# Patient Record
Sex: MALE | Race: WHITE | NOT HISPANIC OR LATINO | Employment: OTHER | URBAN - METROPOLITAN AREA
[De-identification: names, ages, dates, MRNs, and addresses within clinical notes are randomized per-mention and may not be internally consistent; named-entity substitution may affect disease eponyms.]

---

## 2018-08-31 ENCOUNTER — OFFICE VISIT (OUTPATIENT)
Dept: URGENT CARE | Facility: CLINIC | Age: 63
End: 2018-08-31
Payer: MEDICAID

## 2018-08-31 VITALS
DIASTOLIC BLOOD PRESSURE: 84 MMHG | BODY MASS INDEX: 26.88 KG/M2 | SYSTOLIC BLOOD PRESSURE: 132 MMHG | TEMPERATURE: 98 F | RESPIRATION RATE: 18 BRPM | OXYGEN SATURATION: 98 % | HEART RATE: 88 BPM | WEIGHT: 192 LBS | HEIGHT: 71 IN

## 2018-08-31 DIAGNOSIS — M25.521 PAIN AND SWELLING OF RIGHT ELBOW: ICD-10-CM
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
## 2018-08-31 DIAGNOSIS — S50.01XA CONTUSION OF RIGHT ELBOW, INITIAL ENCOUNTER: Primary | ICD-10-CM

## 2018-08-31 DIAGNOSIS — S50.311A ABRASION OF RIGHT ELBOW, INITIAL ENCOUNTER: ICD-10-CM

## 2018-08-31 DIAGNOSIS — M25.421 PAIN AND SWELLING OF RIGHT ELBOW: ICD-10-CM

## 2018-08-31 PROCEDURE — 99203 OFFICE O/P NEW LOW 30 MIN: CPT | Mod: 25,S$GLB,, | Performed by: PHYSICIAN ASSISTANT

## 2018-08-31 PROCEDURE — 90714 TD VACC NO PRESV 7 YRS+ IM: CPT | Mod: S$GLB,,, | Performed by: PHYSICIAN ASSISTANT

## 2018-08-31 PROCEDURE — 90471 IMMUNIZATION ADMIN: CPT | Mod: S$GLB,,, | Performed by: PHYSICIAN ASSISTANT

## 2018-08-31 RX ORDER — IBUPROFEN 600 MG/1
600 TABLET ORAL EVERY 6 HOURS PRN
Qty: 24 TABLET | Refills: 0 | Status: SHIPPED | OUTPATIENT
Start: 2018-08-31 | End: 2018-08-31 | Stop reason: CLARIF

## 2018-08-31 RX ORDER — FUROSEMIDE 20 MG/1
TABLET ORAL
Refills: 2 | COMMUNITY
Start: 2018-08-13

## 2018-08-31 RX ORDER — MUPIROCIN 20 MG/G
OINTMENT TOPICAL 2 TIMES DAILY
Qty: 22 G | Refills: 0 | Status: SHIPPED | OUTPATIENT
Start: 2018-08-31

## 2018-08-31 RX ORDER — WARFARIN 7.5 MG/1
TABLET ORAL
Refills: 1 | COMMUNITY
Start: 2018-08-20

## 2018-08-31 RX ORDER — DILTIAZEM HYDROCHLORIDE 240 MG/1
CAPSULE, EXTENDED RELEASE ORAL
Refills: 11 | COMMUNITY
Start: 2018-07-12

## 2018-08-31 RX ORDER — ACETAMINOPHEN 500 MG
500 TABLET ORAL
COMMUNITY

## 2018-08-31 RX ORDER — LISINOPRIL 10 MG/1
TABLET ORAL
Refills: 10 | COMMUNITY
Start: 2018-08-13

## 2018-08-31 RX ORDER — GABAPENTIN 300 MG/1
CAPSULE ORAL
Refills: 8 | COMMUNITY
Start: 2018-08-20

## 2018-08-31 NOTE — PROGRESS NOTES
"Subjective:       Patient ID: Ed Bunch is a 63 y.o. male.    Vitals:  height is 5' 11" (1.803 m) and weight is 87.1 kg (192 lb). His tympanic temperature is 97.5 °F (36.4 °C). His blood pressure is 132/84 and his pulse is 88. His respiration is 18 and oxygen saturation is 98%.     Chief Complaint: Elbow Injury (Pt fell and landed on R elbow.)    Pt states he fell and landed on his tailbone and R elbow, has some minor lacerations and swelling on elbow. HE C/O RIGHT ELBOW PAIN AND SWELLING. DENIES BACK PAIN. TD STATUS UNKNOWN. MB      Elbow Injury   This is a new problem. The current episode started today. The problem occurs constantly. The problem has been unchanged. Associated symptoms include joint swelling. Pertinent negatives include no abdominal pain, chest pain, chills, fever, headaches, nausea, rash, sore throat or vomiting. The symptoms are aggravated by bending. He has tried nothing for the symptoms. The treatment provided no relief.     Review of Systems   Constitution: Negative for chills and fever.   HENT: Negative for sore throat.    Eyes: Negative for blurred vision.   Cardiovascular: Negative for chest pain.   Respiratory: Negative for shortness of breath.    Skin: Negative for rash.   Musculoskeletal: Positive for falls, joint pain, joint swelling and stiffness. Negative for back pain.   Gastrointestinal: Negative for abdominal pain, diarrhea, nausea and vomiting.   Neurological: Negative for headaches.   Psychiatric/Behavioral: The patient is not nervous/anxious.        Objective:      Physical Exam   Constitutional: He appears well-developed and well-nourished. He is active. No distress.   HENT:   Head: Normocephalic and atraumatic.   Right Ear: Hearing and external ear normal.   Left Ear: Hearing and external ear normal.   Nose: Nose normal. No nasal deformity. No epistaxis.   Mouth/Throat: Oropharynx is clear and moist and mucous membranes are normal.   Eyes: Conjunctivae and lids are normal. " No scleral icterus.   Neck: Trachea normal and normal range of motion.   Cardiovascular: Intact distal pulses and normal pulses.   Pulmonary/Chest: Effort normal. No stridor. No respiratory distress.   Musculoskeletal:        Right elbow: He exhibits swelling (OLECRANON BURSA). He exhibits normal range of motion. Tenderness found. Olecranon process tenderness noted.   Neurological: He is alert. He has normal strength. He is not disoriented. No sensory deficit. GCS eye subscore is 4. GCS verbal subscore is 5. GCS motor subscore is 6.   Skin: Skin is warm and dry. Capillary refill takes less than 2 seconds. Abrasion (RIGHT ELBOW) noted. He is not diaphoretic.   Psychiatric: He has a normal mood and affect. His speech is normal and behavior is normal. He is attentive.   Nursing note and vitals reviewed.        X-ray Elbow Complete 3 Views Right    Result Date: 8/31/2018  EXAMINATION: XR ELBOW COMPLETE 3 VIEW RIGHT CLINICAL HISTORY: fall onto right elbow;. Pain in right elbow TECHNIQUE: AP, lateral, and oblique views of the right elbow were performed. COMPARISON: None FINDINGS: Bones are well mineralized. Alignment is within normal limits. No displaced fracture, dislocation or destructive osseous process.  No large elbow joint effusion.  Joint spaces appear relatively maintained.  Enthesophytes at the lateral humeral epicondyle. Nonspecific soft tissue swelling about the posterior aspect of the elbow likely representing edema or cellulitis.  No subcutaneous emphysema or radiodense retained foreign body.  Scattered advanced atherosclerotic vascular calcifications noted.     Posterior right elbow nonspecific soft tissue swelling, without acute displaced fracture-dislocation identified. Electronically signed by: Judah Walter MD Date:    08/31/2018 Time:    13:55  Assessment:       1. Contusion of right elbow, initial encounter    2. Abrasion of right elbow, initial encounter    3. Pain and swelling of right elbow    4.  Fall, initial encounter        Plan:         Contusion of right elbow, initial encounter    Abrasion of right elbow, initial encounter  -     Td Vaccine (Adult)  -     mupirocin (BACTROBAN) 2 % ointment; Apply topically 2 (two) times daily. AAA  Dispense: 22 g; Refill: 0    Pain and swelling of right elbow  -     X-Ray Elbow Complete 3 views Right; Future; Expected date: 08/31/2018  -     Discontinue: ibuprofen (ADVIL,MOTRIN) 600 MG tablet; Take 1 tablet (600 mg total) by mouth every 6 (six) hours as needed for Pain. Take with meals.  Dispense: 24 tablet; Refill: 0    Fall, initial encounter      Patient Instructions     Abrasions  Abrasions are skin scrapes. Their treatment depends on how large and deep the abrasion is.  Home care  You may be prescribed an antibiotic cream or ointment to apply to the wound. This helps prevent infection. Follow instructions when using this medicine.  General care  · To care for the abrasion, do the following each day for as long as directed by your healthcare provider.  ¨ If you were given a bandage, change it once a day. If your bandage sticks to the wound, soak it in warm water until it loosens.  ¨ Wash the area with soap and warm water. You may do this in a sink or under a tub faucet or shower. Rinse off the soap. Then pat the area dry with a clean towel.  ¨ If antibiotic ointment or cream was prescribed, reapply it to the wound as directed. Cover the wound with a fresh nonstick bandage. If the bandage becomes wet or dirty, change it as soon as possible.  ¨ Some antibiotic ointments or cream can cause an allergic reaction or dermatitis. This may cause redness, itching and or hives. If this occurs, stop using the ointment immediately and wash off any remaining ointment. You may need to take some allergy medicine to relieve symptoms.  · You may use acetaminophen or ibuprofen to control pain unless another pain medicine was prescribed. Talk with your healthcare provider before using  these medicines if you have chronic liver or kidney disease or ever had a stomach ulcer or GI bleeding. Dont use ibuprofen in children younger than six months old.  · Most skin wounds heal within 10 days. But an infection may occur even with treatment. So its important to watch the wound for signs of infection as listed below.  Follow-up care  Follow up with your healthcare provider, or as advised.  When to seek medical advice  Call your healthcare provider right away if any of these occur:  · Fever of 100.4ºF (38ºC) or higher, or as directed by your healthcare provider  · Increasing pain, redness, swelling, or drainage from the wound  · Bleeding from the wound that does not stop after a few minutes of steady, firm pressure  · Decreased ability to move any body part near the wound  Date Last Reviewed: 3/3/2017  © 3119-5026 [employer]. [address] [place] [address]. All rights reserved. This information is not intended as a substitute for professional medical care. Always follow your healthcare professional's instructions.        Elbow Bruise  You have a bruise (contusion) of your elbow. A bruise causes local pain, swelling, and sometimes bruising. There are no broken bones. This injury takes a few days to a few weeks to heal. You may be given a sling for comfort and arm support.  You may notice color changes over the skin. It may change from reddish to bluish to greenish or yellowish before the bruising fades. The skin will then go back to its normal color.  Home care  Follow these guidelines when caring for yourself at home.  · Keep your arm elevated to reduce pain and swelling. This is most important during the first 2 days (48 hours) after the injury.  · Put an ice pack on the injured area. Do this for 20 minutes every 1 to 2 hours the first day. You can make an ice pack by wrapping a plastic bag of ice in a thin towel. You should continue to use the ice pack 3 to 4 times a day for  the next 2 days. Then use the ice pack as needed to ease pain and swelling.  · Dont use a heating pad.  · Dont stick a needle into the contusion or bruising to drain it.  · You may use acetaminophen or ibuprofen to control pain, unless another pain medicine was prescribed. If you have chronic liver or kidney disease, talk with your healthcare provider before using these medicines. Also talk with your provider if youve had a stomach ulcer or gastrointestinal bleeding.  · If a sling was provided, you may take it off to shower or bathe. Dont wear it for more than 1 week or it may cause joint stiffness.  Follow-up care  Follow up with your healthcare provider, or as advised, if you are not starting to get better within the next 3 days.  When to seek medical advice  Call your healthcare provider right away if any of these occur:  · Pain or swelling gets worse  · The back of your elbow becomes very swollen where it almost looks like a gold ball or egg-like mass is growing there. This is a sign of olecrenon bursitis or septic bursitis which may need immediate treatment.  · Redness, red streaks down the arm, warmth, or drainage from the bruise  · Hand or fingers becomes cold, blue, numb, or tingly  · New bruises, and you dont know what caused them  · Contusion doesnt heal  Date Last Reviewed: 2/1/2017  © 1424-9592 [employer]. [address] [place] [address], [place] [address]. All rights reserved. This information is not intended as a substitute for professional medical care. Always follow your healthcare professional's instructions.

## 2018-08-31 NOTE — PATIENT INSTRUCTIONS
Abrasions  Abrasions are skin scrapes. Their treatment depends on how large and deep the abrasion is.  Home care  You may be prescribed an antibiotic cream or ointment to apply to the wound. This helps prevent infection. Follow instructions when using this medicine.  General care  · To care for the abrasion, do the following each day for as long as directed by your healthcare provider.  ¨ If you were given a bandage, change it once a day. If your bandage sticks to the wound, soak it in warm water until it loosens.  ¨ Wash the area with soap and warm water. You may do this in a sink or under a tub faucet or shower. Rinse off the soap. Then pat the area dry with a clean towel.  ¨ If antibiotic ointment or cream was prescribed, reapply it to the wound as directed. Cover the wound with a fresh nonstick bandage. If the bandage becomes wet or dirty, change it as soon as possible.  ¨ Some antibiotic ointments or cream can cause an allergic reaction or dermatitis. This may cause redness, itching and or hives. If this occurs, stop using the ointment immediately and wash off any remaining ointment. You may need to take some allergy medicine to relieve symptoms.  · You may use acetaminophen or ibuprofen to control pain unless another pain medicine was prescribed. Talk with your healthcare provider before using these medicines if you have chronic liver or kidney disease or ever had a stomach ulcer or GI bleeding. Dont use ibuprofen in children younger than six months old.  · Most skin wounds heal within 10 days. But an infection may occur even with treatment. So its important to watch the wound for signs of infection as listed below.  Follow-up care  Follow up with your healthcare provider, or as advised.  When to seek medical advice  Call your healthcare provider right away if any of these occur:  · Fever of 100.4ºF (38ºC) or higher, or as directed by your healthcare provider  · Increasing pain, redness, swelling, or  drainage from the wound  · Bleeding from the wound that does not stop after a few minutes of steady, firm pressure  · Decreased ability to move any body part near the wound  Date Last Reviewed: 3/3/2017  © 8669-0095 [employer]. [address] [place] [address], [place] [address]. All rights reserved. This information is not intended as a substitute for professional medical care. Always follow your healthcare professional's instructions.        Elbow Bruise  You have a bruise (contusion) of your elbow. A bruise causes local pain, swelling, and sometimes bruising. There are no broken bones. This injury takes a few days to a few weeks to heal. You may be given a sling for comfort and arm support.  You may notice color changes over the skin. It may change from reddish to bluish to greenish or yellowish before the bruising fades. The skin will then go back to its normal color.  Home care  Follow these guidelines when caring for yourself at home.  · Keep your arm elevated to reduce pain and swelling. This is most important during the first 2 days (48 hours) after the injury.  · Put an ice pack on the injured area. Do this for 20 minutes every 1 to 2 hours the first day. You can make an ice pack by wrapping a plastic bag of ice in a thin towel. You should continue to use the ice pack 3 to 4 times a day for the next 2 days. Then use the ice pack as needed to ease pain and swelling.  · Dont use a heating pad.  · Dont stick a needle into the contusion or bruising to drain it.  · You may use acetaminophen or ibuprofen to control pain, unless another pain medicine was prescribed. If you have chronic liver or kidney disease, talk with your healthcare provider before using these medicines. Also talk with your provider if youve had a stomach ulcer or gastrointestinal bleeding.  · If a sling was provided, you may take it off to shower or bathe. Dont wear it for more than 1 week or it may cause joint stiffness.  Follow-up  care  Follow up with your healthcare provider, or as advised, if you are not starting to get better within the next 3 days.  When to seek medical advice  Call your healthcare provider right away if any of these occur:  · Pain or swelling gets worse  · The back of your elbow becomes very swollen where it almost looks like a gold ball or egg-like mass is growing there. This is a sign of olecrenon bursitis or septic bursitis which may need immediate treatment.  · Redness, red streaks down the arm, warmth, or drainage from the bruise  · Hand or fingers becomes cold, blue, numb, or tingly  · New bruises, and you dont know what caused them  · Contusion doesnt heal  Date Last Reviewed: 2/1/2017  © 5487-0890 [employer]. [address] [place] [address], [place] [address]. All rights reserved. This information is not intended as a substitute for professional medical care. Always follow your healthcare professional's instructions.

## 2019-02-15 ENCOUNTER — HOSPITAL ENCOUNTER (INPATIENT)
Facility: HOSPITAL | Age: 64
LOS: 4 days | Discharge: HOME HEALTH | DRG: 871 | End: 2019-02-19
Attending: EMERGENCY MEDICINE | Admitting: INTERNAL MEDICINE
Payer: MEDICAID

## 2019-02-15 DIAGNOSIS — R10.9 ACUTE RIGHT FLANK PAIN: ICD-10-CM

## 2019-02-15 DIAGNOSIS — I10 ESSENTIAL HYPERTENSION: ICD-10-CM

## 2019-02-15 DIAGNOSIS — R07.9 CHEST PAIN: ICD-10-CM

## 2019-02-15 DIAGNOSIS — R19.7 DIARRHEA, UNSPECIFIED TYPE: ICD-10-CM

## 2019-02-15 DIAGNOSIS — B95.5 STREPTOCOCCAL BACTEREMIA: ICD-10-CM

## 2019-02-15 DIAGNOSIS — Z79.01 CURRENT USE OF LONG TERM ANTICOAGULATION: ICD-10-CM

## 2019-02-15 DIAGNOSIS — A41.9 SEPSIS DUE TO PNEUMONIA: ICD-10-CM

## 2019-02-15 DIAGNOSIS — J18.9 PNEUMONIA OF RIGHT MIDDLE LOBE DUE TO INFECTIOUS ORGANISM: Primary | ICD-10-CM

## 2019-02-15 DIAGNOSIS — E87.1 HYPONATREMIA: ICD-10-CM

## 2019-02-15 DIAGNOSIS — I50.23 ACUTE ON CHRONIC SYSTOLIC HEART FAILURE: ICD-10-CM

## 2019-02-15 DIAGNOSIS — J18.9 SEPSIS DUE TO PNEUMONIA: ICD-10-CM

## 2019-02-15 DIAGNOSIS — R78.81 STREPTOCOCCAL BACTEREMIA: ICD-10-CM

## 2019-02-15 DIAGNOSIS — I48.91 ATRIAL FIBRILLATION WITH RVR: ICD-10-CM

## 2019-02-15 DIAGNOSIS — I50.9 HEART FAILURE: ICD-10-CM

## 2019-02-15 LAB
ALBUMIN SERPL BCP-MCNC: 3.3 G/DL
ALP SERPL-CCNC: 132 U/L
ALT SERPL W/O P-5'-P-CCNC: 29 U/L
ANION GAP SERPL CALC-SCNC: 11 MMOL/L
APTT BLDCRRT: 45.8 SEC
AST SERPL-CCNC: 25 U/L
BACTERIA #/AREA URNS AUTO: NORMAL /HPF
BASOPHILS # BLD AUTO: 0.04 K/UL
BASOPHILS NFR BLD: 0.2 %
BILIRUB SERPL-MCNC: 0.7 MG/DL
BILIRUB UR QL STRIP: NEGATIVE
BNP SERPL-MCNC: 84 PG/ML
BUN SERPL-MCNC: 23 MG/DL
CALCIUM SERPL-MCNC: 10.5 MG/DL
CHLORIDE SERPL-SCNC: 99 MMOL/L
CLARITY UR REFRACT.AUTO: CLEAR
CO2 SERPL-SCNC: 18 MMOL/L
COLOR UR AUTO: YELLOW
CREAT SERPL-MCNC: 1 MG/DL
DIFFERENTIAL METHOD: ABNORMAL
EOSINOPHIL # BLD AUTO: 0 K/UL
EOSINOPHIL NFR BLD: 0 %
ERYTHROCYTE [DISTWIDTH] IN BLOOD BY AUTOMATED COUNT: 12.3 %
EST. GFR  (AFRICAN AMERICAN): >60 ML/MIN/1.73 M^2
EST. GFR  (NON AFRICAN AMERICAN): >60 ML/MIN/1.73 M^2
GLUCOSE SERPL-MCNC: 124 MG/DL
GLUCOSE UR QL STRIP: NEGATIVE
HCT VFR BLD AUTO: 35.3 %
HGB BLD-MCNC: 12.2 G/DL
HGB UR QL STRIP: NEGATIVE
HYALINE CASTS UR QL AUTO: 1 /LPF
IMM GRANULOCYTES # BLD AUTO: 0.26 K/UL
IMM GRANULOCYTES NFR BLD AUTO: 1.2 %
INR PPP: 4.2
KETONES UR QL STRIP: NEGATIVE
LACTATE SERPL-SCNC: 0.9 MMOL/L
LEUKOCYTE ESTERASE UR QL STRIP: NEGATIVE
LIPASE SERPL-CCNC: 27 U/L
LYMPHOCYTES # BLD AUTO: 1.5 K/UL
LYMPHOCYTES NFR BLD: 7.1 %
MCH RBC QN AUTO: 36.6 PG
MCHC RBC AUTO-ENTMCNC: 34.6 G/DL
MCV RBC AUTO: 106 FL
MICROSCOPIC COMMENT: NORMAL
MONOCYTES # BLD AUTO: 1.6 K/UL
MONOCYTES NFR BLD: 7.8 %
NEUTROPHILS # BLD AUTO: 17.5 K/UL
NEUTROPHILS NFR BLD: 83.7 %
NITRITE UR QL STRIP: NEGATIVE
NRBC BLD-RTO: 0 /100 WBC
PH UR STRIP: 5 [PH] (ref 5–8)
PLATELET # BLD AUTO: 281 K/UL
PMV BLD AUTO: 8.7 FL
POTASSIUM SERPL-SCNC: 4.3 MMOL/L
PROT SERPL-MCNC: 8.2 G/DL
PROT UR QL STRIP: NEGATIVE
PROTHROMBIN TIME: 41.3 SEC
RBC # BLD AUTO: 3.33 M/UL
RBC #/AREA URNS AUTO: 3 /HPF (ref 0–4)
SODIUM SERPL-SCNC: 128 MMOL/L
SP GR UR STRIP: 1.03 (ref 1–1.03)
TROPONIN I SERPL DL<=0.01 NG/ML-MCNC: <0.006 NG/ML
URN SPEC COLLECT METH UR: NORMAL
WBC # BLD AUTO: 20.94 K/UL
WBC #/AREA URNS AUTO: 2 /HPF (ref 0–5)

## 2019-02-15 PROCEDURE — 99223 1ST HOSP IP/OBS HIGH 75: CPT | Mod: ,,, | Performed by: PHYSICIAN ASSISTANT

## 2019-02-15 PROCEDURE — 96361 HYDRATE IV INFUSION ADD-ON: CPT

## 2019-02-15 PROCEDURE — 87040 BLOOD CULTURE FOR BACTERIA: CPT

## 2019-02-15 PROCEDURE — 12000002 HC ACUTE/MED SURGE SEMI-PRIVATE ROOM

## 2019-02-15 PROCEDURE — 96366 THER/PROPH/DIAG IV INF ADDON: CPT

## 2019-02-15 PROCEDURE — 25000003 PHARM REV CODE 250: Performed by: EMERGENCY MEDICINE

## 2019-02-15 PROCEDURE — 99285 PR EMERGENCY DEPT VISIT,LEVEL V: ICD-10-PCS | Mod: ,,, | Performed by: EMERGENCY MEDICINE

## 2019-02-15 PROCEDURE — 93010 ELECTROCARDIOGRAM REPORT: CPT | Mod: ,,, | Performed by: INTERNAL MEDICINE

## 2019-02-15 PROCEDURE — 96376 TX/PRO/DX INJ SAME DRUG ADON: CPT

## 2019-02-15 PROCEDURE — 85025 COMPLETE CBC W/AUTO DIFF WBC: CPT

## 2019-02-15 PROCEDURE — 99285 EMERGENCY DEPT VISIT HI MDM: CPT | Mod: 25

## 2019-02-15 PROCEDURE — 85730 THROMBOPLASTIN TIME PARTIAL: CPT

## 2019-02-15 PROCEDURE — 63600175 PHARM REV CODE 636 W HCPCS: Performed by: EMERGENCY MEDICINE

## 2019-02-15 PROCEDURE — 87077 CULTURE AEROBIC IDENTIFY: CPT

## 2019-02-15 PROCEDURE — 63600175 PHARM REV CODE 636 W HCPCS: Performed by: PHYSICIAN ASSISTANT

## 2019-02-15 PROCEDURE — 93010 EKG 12-LEAD: ICD-10-PCS | Mod: ,,, | Performed by: INTERNAL MEDICINE

## 2019-02-15 PROCEDURE — 83605 ASSAY OF LACTIC ACID: CPT

## 2019-02-15 PROCEDURE — 11000001 HC ACUTE MED/SURG PRIVATE ROOM

## 2019-02-15 PROCEDURE — 85610 PROTHROMBIN TIME: CPT

## 2019-02-15 PROCEDURE — 81001 URINALYSIS AUTO W/SCOPE: CPT

## 2019-02-15 PROCEDURE — 93005 ELECTROCARDIOGRAM TRACING: CPT

## 2019-02-15 PROCEDURE — 80053 COMPREHEN METABOLIC PANEL: CPT

## 2019-02-15 PROCEDURE — 25500020 PHARM REV CODE 255: Performed by: EMERGENCY MEDICINE

## 2019-02-15 PROCEDURE — 83690 ASSAY OF LIPASE: CPT

## 2019-02-15 PROCEDURE — 99223 PR INITIAL HOSPITAL CARE,LEVL III: ICD-10-PCS | Mod: ,,, | Performed by: PHYSICIAN ASSISTANT

## 2019-02-15 PROCEDURE — 96365 THER/PROPH/DIAG IV INF INIT: CPT

## 2019-02-15 PROCEDURE — 96375 TX/PRO/DX INJ NEW DRUG ADDON: CPT

## 2019-02-15 PROCEDURE — 83880 ASSAY OF NATRIURETIC PEPTIDE: CPT

## 2019-02-15 PROCEDURE — 87186 SC STD MICRODIL/AGAR DIL: CPT

## 2019-02-15 PROCEDURE — 84484 ASSAY OF TROPONIN QUANT: CPT

## 2019-02-15 PROCEDURE — 99285 EMERGENCY DEPT VISIT HI MDM: CPT | Mod: ,,, | Performed by: EMERGENCY MEDICINE

## 2019-02-15 RX ORDER — OSELTAMIVIR PHOSPHATE 75 MG/1
75 CAPSULE ORAL DAILY
Status: DISCONTINUED | OUTPATIENT
Start: 2019-02-16 | End: 2019-02-15

## 2019-02-15 RX ORDER — LISINOPRIL 10 MG/1
10 TABLET ORAL DAILY
Status: DISCONTINUED | OUTPATIENT
Start: 2019-02-16 | End: 2019-02-16

## 2019-02-15 RX ORDER — MORPHINE SULFATE 2 MG/ML
2 INJECTION, SOLUTION INTRAMUSCULAR; INTRAVENOUS EVERY 6 HOURS PRN
Status: DISCONTINUED | OUTPATIENT
Start: 2019-02-16 | End: 2019-02-16

## 2019-02-15 RX ORDER — SODIUM CHLORIDE 9 MG/ML
INJECTION, SOLUTION INTRAVENOUS CONTINUOUS
Status: DISCONTINUED | OUTPATIENT
Start: 2019-02-16 | End: 2019-02-15

## 2019-02-15 RX ORDER — IBUPROFEN 200 MG
16 TABLET ORAL
Status: DISCONTINUED | OUTPATIENT
Start: 2019-02-15 | End: 2019-02-19 | Stop reason: HOSPADM

## 2019-02-15 RX ORDER — DILTIAZEM HYDROCHLORIDE 240 MG/1
240 CAPSULE, COATED, EXTENDED RELEASE ORAL DAILY
Status: DISCONTINUED | OUTPATIENT
Start: 2019-02-16 | End: 2019-02-16

## 2019-02-15 RX ORDER — MORPHINE SULFATE 4 MG/ML
4 INJECTION, SOLUTION INTRAMUSCULAR; INTRAVENOUS EVERY 4 HOURS PRN
Status: DISCONTINUED | OUTPATIENT
Start: 2019-02-15 | End: 2019-02-15

## 2019-02-15 RX ORDER — GABAPENTIN 300 MG/1
300 CAPSULE ORAL DAILY
Status: DISCONTINUED | OUTPATIENT
Start: 2019-02-16 | End: 2019-02-19 | Stop reason: HOSPADM

## 2019-02-15 RX ORDER — AMOXICILLIN 250 MG
1 CAPSULE ORAL 2 TIMES DAILY PRN
Status: DISCONTINUED | OUTPATIENT
Start: 2019-02-15 | End: 2019-02-19 | Stop reason: HOSPADM

## 2019-02-15 RX ORDER — SODIUM CHLORIDE 0.9 % (FLUSH) 0.9 %
5 SYRINGE (ML) INJECTION
Status: DISCONTINUED | OUTPATIENT
Start: 2019-02-15 | End: 2019-02-19 | Stop reason: HOSPADM

## 2019-02-15 RX ORDER — RAMELTEON 8 MG/1
8 TABLET ORAL NIGHTLY PRN
Status: DISCONTINUED | OUTPATIENT
Start: 2019-02-15 | End: 2019-02-19 | Stop reason: HOSPADM

## 2019-02-15 RX ORDER — FUROSEMIDE 20 MG/1
20 TABLET ORAL DAILY
Status: DISCONTINUED | OUTPATIENT
Start: 2019-02-16 | End: 2019-02-19 | Stop reason: HOSPADM

## 2019-02-15 RX ORDER — IBUPROFEN 200 MG
24 TABLET ORAL
Status: DISCONTINUED | OUTPATIENT
Start: 2019-02-15 | End: 2019-02-19 | Stop reason: HOSPADM

## 2019-02-15 RX ORDER — VANCOMYCIN HCL IN 5 % DEXTROSE 1.25 G/25
15 PLASTIC BAG, INJECTION (ML) INTRAVENOUS
Status: DISCONTINUED | OUTPATIENT
Start: 2019-02-16 | End: 2019-02-17

## 2019-02-15 RX ORDER — VANCOMYCIN 1.75 GRAM/500 ML IN 0.9 % SODIUM CHLORIDE INTRAVENOUS
20
Status: COMPLETED | OUTPATIENT
Start: 2019-02-15 | End: 2019-02-16

## 2019-02-15 RX ORDER — CEFTRIAXONE 1 G/1
1 INJECTION, POWDER, FOR SOLUTION INTRAMUSCULAR; INTRAVENOUS
Status: DISCONTINUED | OUTPATIENT
Start: 2019-02-15 | End: 2019-02-15

## 2019-02-15 RX ORDER — ACETAMINOPHEN 500 MG
1000 TABLET ORAL
Status: COMPLETED | OUTPATIENT
Start: 2019-02-15 | End: 2019-02-15

## 2019-02-15 RX ORDER — OSELTAMIVIR PHOSPHATE 75 MG/1
75 CAPSULE ORAL 2 TIMES DAILY
Status: DISCONTINUED | OUTPATIENT
Start: 2019-02-16 | End: 2019-02-18

## 2019-02-15 RX ORDER — ACETAMINOPHEN 325 MG/1
650 TABLET ORAL EVERY 4 HOURS PRN
Status: DISCONTINUED | OUTPATIENT
Start: 2019-02-15 | End: 2019-02-19 | Stop reason: HOSPADM

## 2019-02-15 RX ORDER — ACETAMINOPHEN 325 MG/1
650 TABLET ORAL EVERY 8 HOURS PRN
Status: DISCONTINUED | OUTPATIENT
Start: 2019-02-15 | End: 2019-02-15

## 2019-02-15 RX ORDER — LIDOCAINE 50 MG/G
1 PATCH TOPICAL
Status: DISCONTINUED | OUTPATIENT
Start: 2019-02-16 | End: 2019-02-19 | Stop reason: HOSPADM

## 2019-02-15 RX ORDER — MOXIFLOXACIN HYDROCHLORIDE 400 MG/250ML
400 INJECTION, SOLUTION INTRAVENOUS
Status: DISCONTINUED | OUTPATIENT
Start: 2019-02-15 | End: 2019-02-15

## 2019-02-15 RX ORDER — IPRATROPIUM BROMIDE AND ALBUTEROL SULFATE 2.5; .5 MG/3ML; MG/3ML
3 SOLUTION RESPIRATORY (INHALATION) EVERY 4 HOURS PRN
Status: DISCONTINUED | OUTPATIENT
Start: 2019-02-15 | End: 2019-02-19 | Stop reason: HOSPADM

## 2019-02-15 RX ORDER — LEVOFLOXACIN 750 MG/1
750 TABLET ORAL DAILY
Status: DISCONTINUED | OUTPATIENT
Start: 2019-02-15 | End: 2019-02-16

## 2019-02-15 RX ORDER — HYDROMORPHONE HYDROCHLORIDE 1 MG/ML
1 INJECTION, SOLUTION INTRAMUSCULAR; INTRAVENOUS; SUBCUTANEOUS
Status: COMPLETED | OUTPATIENT
Start: 2019-02-15 | End: 2019-02-15

## 2019-02-15 RX ORDER — ONDANSETRON 4 MG/1
4 TABLET, ORALLY DISINTEGRATING ORAL EVERY 8 HOURS PRN
Status: DISCONTINUED | OUTPATIENT
Start: 2019-02-15 | End: 2019-02-19 | Stop reason: HOSPADM

## 2019-02-15 RX ORDER — KETOROLAC TROMETHAMINE 30 MG/ML
15 INJECTION, SOLUTION INTRAMUSCULAR; INTRAVENOUS EVERY 6 HOURS PRN
Status: DISCONTINUED | OUTPATIENT
Start: 2019-02-15 | End: 2019-02-16

## 2019-02-15 RX ORDER — ONDANSETRON 2 MG/ML
4 INJECTION INTRAMUSCULAR; INTRAVENOUS EVERY 8 HOURS PRN
Status: DISCONTINUED | OUTPATIENT
Start: 2019-02-15 | End: 2019-02-15

## 2019-02-15 RX ORDER — MORPHINE SULFATE 4 MG/ML
4 INJECTION, SOLUTION INTRAMUSCULAR; INTRAVENOUS
Status: COMPLETED | OUTPATIENT
Start: 2019-02-15 | End: 2019-02-15

## 2019-02-15 RX ORDER — HYDROMORPHONE HYDROCHLORIDE 1 MG/ML
1 INJECTION, SOLUTION INTRAMUSCULAR; INTRAVENOUS; SUBCUTANEOUS EVERY 4 HOURS PRN
Status: DISCONTINUED | OUTPATIENT
Start: 2019-02-15 | End: 2019-02-15

## 2019-02-15 RX ORDER — OSELTAMIVIR PHOSPHATE 75 MG/1
75 CAPSULE ORAL
Status: COMPLETED | OUTPATIENT
Start: 2019-02-15 | End: 2019-02-15

## 2019-02-15 RX ORDER — VANCOMYCIN HCL IN 5 % DEXTROSE 1.25 G/25
15 PLASTIC BAG, INJECTION (ML) INTRAVENOUS
Status: DISCONTINUED | OUTPATIENT
Start: 2019-02-16 | End: 2019-02-15

## 2019-02-15 RX ORDER — GLUCAGON 1 MG
1 KIT INJECTION
Status: DISCONTINUED | OUTPATIENT
Start: 2019-02-15 | End: 2019-02-19 | Stop reason: HOSPADM

## 2019-02-15 RX ORDER — PROMETHAZINE HYDROCHLORIDE 12.5 MG/1
12.5 TABLET ORAL EVERY 6 HOURS PRN
Status: DISCONTINUED | OUTPATIENT
Start: 2019-02-15 | End: 2019-02-19 | Stop reason: HOSPADM

## 2019-02-15 RX ORDER — LEVOFLOXACIN 5 MG/ML
750 INJECTION, SOLUTION INTRAVENOUS
Status: DISCONTINUED | OUTPATIENT
Start: 2019-02-16 | End: 2019-02-16

## 2019-02-15 RX ORDER — SODIUM CHLORIDE 0.9 % (FLUSH) 0.9 %
5 SYRINGE (ML) INJECTION
Status: DISCONTINUED | OUTPATIENT
Start: 2019-02-15 | End: 2019-02-15

## 2019-02-15 RX ADMIN — HYDROMORPHONE HYDROCHLORIDE 1 MG: 1 INJECTION, SOLUTION INTRAMUSCULAR; INTRAVENOUS; SUBCUTANEOUS at 10:02

## 2019-02-15 RX ADMIN — OSELTAMIVIR PHOSPHATE 75 MG: 75 CAPSULE ORAL at 10:02

## 2019-02-15 RX ADMIN — HYDROMORPHONE HYDROCHLORIDE 1 MG: 1 INJECTION, SOLUTION INTRAMUSCULAR; INTRAVENOUS; SUBCUTANEOUS at 06:02

## 2019-02-15 RX ADMIN — SODIUM CHLORIDE 1000 ML: 0.9 INJECTION, SOLUTION INTRAVENOUS at 10:02

## 2019-02-15 RX ADMIN — IOHEXOL 100 ML: 350 INJECTION, SOLUTION INTRAVENOUS at 07:02

## 2019-02-15 RX ADMIN — LEVOFLOXACIN 750 MG: 750 TABLET, FILM COATED ORAL at 10:02

## 2019-02-15 RX ADMIN — ACETAMINOPHEN 1000 MG: 500 TABLET ORAL at 08:02

## 2019-02-15 RX ADMIN — MORPHINE SULFATE 4 MG: 4 INJECTION INTRAVENOUS at 06:02

## 2019-02-15 RX ADMIN — VANCOMYCIN HYDROCHLORIDE 1750 MG: 100 INJECTION, POWDER, LYOPHILIZED, FOR SOLUTION INTRAVENOUS at 10:02

## 2019-02-15 NOTE — ED TRIAGE NOTES
Pt reported to ED with 10/10 stabbing pain in the right side of lower lungs radiating to the back. Pain is stabbing with every breath. Lung sounds clear and not diminished. Pt tachypnic at 30 bpm. 96% O2 on room air. The pain started at 4:00pm while sitting and watching tv. Wife at the bed side. No complaints of chest pain, nausea or vomiting.no swelling of extremities. Pt has frequent episodes of diarrhea that started four days ago. Pt reported a hematuria yesterday.  Pt has hx of A fib, CHF. No surgical hx.

## 2019-02-15 NOTE — PROVIDER PROGRESS NOTES - EMERGENCY DEPT.
Encounter Date: 2/15/2019    ED Physician Progress Notes         EKG - STEMI Decision  Initial Reading: No STEMI present.  Response: No Action Needed.

## 2019-02-15 NOTE — ED PROVIDER NOTES
"SCRIBE #1 NOTE: I, Bryn Molina, am scribing for, and in the presence of, Nina Miranda MD. the EKG reading. Other sections scribed: HPI, ROS, PE, MDM.       CC: Chest Pain (Pt with right sided chest "cramping" increases with deep breath.  Pt states he has had the flu with coughing and congestion.)      HPI: Ed Bunch is a 63 y.o. year old male with co-morbidities including afib, CHF, on coumadin, and HTN who presents to the ED complaining of right flank pain this afternoon. Pt reports prior similar episode 2 days ago, which resolved after approximately 4 hours. He states that symptoms were still present at this time, but relatively more mild. Pt states flank pain is worse with heavy breathing. Non-radiating,. sharp Wife at bedside states he was dx with the flu 1 week ago. Pt reports SOB, cough, coughing blood 2 days ago, and diarrhea for 5 days; but denies urine problems, previous episodes of kidney stones, or any other complaints at this time. Pt states he has attempted to manage pain with tylenol with no relief. Reports blood in the urine x 2 days, no hx of kidney stones        Past Medical History:   Diagnosis Date    Atrial fibrillation     Hypertension      Past Surgical History:   Procedure Laterality Date    CARDIOVERSION      COLONOSCOPY      TONSILLECTOMY       Family History   Problem Relation Age of Onset    No Known Problems Mother     Cancer Father     Cancer Sister      No current facility-administered medications on file prior to encounter.      Current Outpatient Medications on File Prior to Encounter   Medication Sig Dispense Refill    acetaminophen (TYLENOL) 500 MG tablet Take 500 mg by mouth.      diltiaZEM (TIAZAC) 240 MG Cs24   11    gabapentin (NEURONTIN) 300 MG capsule TK ONE C PO  D  8    lisinopril 10 MG tablet TK 1 T PO  QHS  10    multivitamin capsule Take 1 capsule by mouth.      furosemide (LASIX) 20 MG tablet TK 1 T PO  D  2    mupirocin (BACTROBAN) 2 % ointment " Apply topically 2 (two) times daily. AAA 22 g 0    warfarin (COUMADIN) 7.5 MG tablet   1     Patient has no known allergies.  Social History     Socioeconomic History    Marital status:      Spouse name: Not on file    Number of children: Not on file    Years of education: Not on file    Highest education level: Not on file   Social Needs    Financial resource strain: Not on file    Food insecurity - worry: Not on file    Food insecurity - inability: Not on file    Transportation needs - medical: Not on file    Transportation needs - non-medical: Not on file   Occupational History    Not on file   Tobacco Use    Smoking status: Never Smoker    Smokeless tobacco: Never Used   Substance and Sexual Activity    Alcohol use: Not on file    Drug use: Not on file    Sexual activity: Not on file   Other Topics Concern    Not on file   Social History Narrative    Not on file       ROS:     Constitutional : neg  HEENT neg  Resp: SOB, cough, coughing blood  Cardiac  neg  GI neg  : right flank pain worse with heavy breathing, diarrhea   Neuro neg  Heme/Immune: neg  Endo neg  Skin neg    PHYSICAL EXAM:  Vitals:    02/15/19 2000   BP: 137/80   Pulse: 98   Resp: 20   Temp: 98.1 °F (36.7 °C)         PHYSICAL EXAM:   General: extremely uncomfortable, in pain sitting up at the edge of the bed  VS: triage VS reviewed  HEENT: NC/AT, PERRL, EOMI  Neck: trachea midline  CV: RRR, no m/r/g, no LE pitting edema  Resp: tachypneic, + BS but difficult to asess for abnormalities due to shallow breathing  ABD:  ND, + normal BS, localized pain to the right side of abdomen  Renal: No CVAT  Neuro: AAO x 3, 5/5 muscle strength in upper and lower extremities, sensation grossly intact, face symmetric, speech normal  Ext: no deformity, +2 symmetrical peripheral pulses          DATA & INTERVENTIONS:    LABS reviewed:  Labs Reviewed   CBC W/ AUTO DIFFERENTIAL - Abnormal; Notable for the following components:       Result Value     WBC 20.94 (*)     RBC 3.33 (*)     Hemoglobin 12.2 (*)     Hematocrit 35.3 (*)      (*)     MCH 36.6 (*)     MPV 8.7 (*)     Immature Granulocytes 1.2 (*)     Gran # (ANC) 17.5 (*)     Immature Grans (Abs) 0.26 (*)     Mono # 1.6 (*)     Gran% 83.7 (*)     Lymph% 7.1 (*)     All other components within normal limits   COMPREHENSIVE METABOLIC PANEL - Abnormal; Notable for the following components:    Sodium 128 (*)     CO2 18 (*)     Glucose 124 (*)     Albumin 3.3 (*)     All other components within normal limits   APTT - Abnormal; Notable for the following components:    aPTT 45.8 (*)     All other components within normal limits   PROTIME-INR - Abnormal; Notable for the following components:    Prothrombin Time 41.3 (*)     INR 4.2 (*)     All other components within normal limits   CULTURE, BLOOD   CULTURE, BLOOD   TROPONIN I   URINALYSIS, REFLEX TO URINE CULTURE    Narrative:     Preferred Collection Type->Urine, Clean Catch  yellow and grey   LIPASE   LACTIC ACID, PLASMA   URINALYSIS MICROSCOPIC    Narrative:     Preferred Collection Type->Urine, Clean Catch  yellow and grey       RADIOLOGY reviewed:  Imaging Results          CT Abdomen Pelvis With Contrast (Final result)  Result time 02/15/19 21:12:54    Final result by Yoni Veliz MD (02/15/19 21:12:54)                 Impression:      No acute intra-abdominal findings.    Sigmoid diverticulosis without evidence of acute diverticulitis.    Bilateral L5 pars defects with grade 2 spondylolisthesis.    Right adrenal probable small myelolipoma.  Left adrenal probable small adenoma.    Trace dependent right pleural fluid with adjacent atelectatic change.  Consolidation in the medial segment right middle lobe.  Bandlike opacities are seen at the right lung base, likely atelectasis or scarring.    Electronically signed by resident: Rusty Brothers  Date:    02/15/2019  Time:    20:22    Electronically signed by: Yoni Veliz  MD  Date:    02/15/2019  Time:    21:12             Narrative:    EXAMINATION:  CT ABDOMEN PELVIS WITH CONTRAST    CLINICAL HISTORY:  severe right flank pain, hx of a. fib on coumadin, r/o bleed vs ischemia;    TECHNIQUE:  Low dose axial images, sagittal and coronal reformations were obtained from the lung bases to the pubic symphysis following the IV administration of 100 mL of Omnipaque 350 and the oral administration of 30 mL of Omnipaque 350.    COMPARISON:  None.    FINDINGS:  Heart: Normal in size. No pericardial effusion.    Lung Bases: Trace dependent right pleural fluid with adjacent atelectatic change.  Consolidation in the medial segment right middle lobe.  Bandlike opacities are seen at the right lung base, likely atelectasis or scarring.    Liver: Normal in size and attenuation, with no focal hepatic lesions.    Gallbladder: No calcified gallstones.    Bile Ducts: No evidence of dilated ducts.    Pancreas: No mass or peripancreatic fat stranding.    Spleen: Unremarkable.    Adrenals: Hypoattenuating small nodule in the right adrenal gland consistent with probable myelolipoma.  Subcentimeter left adrenal nodule consistent with probable adenoma.    Kidneys/ Ureters: Normal in size and location. Normal concentration and excretion of contrast. No hydronephrosis or nephrolithiasis. No ureteral dilatation.    Bladder: No evidence of wall thickening.    Reproductive organs: Unremarkable.    GI Tract/Mesentery: Sigmoid diverticulosis without evidence of acute diverticulitis.  No evidence of bowel obstruction or inflammation. Appendix visualized and unremarkable.    Peritoneal Space: No ascites. No free air.    Retroperitoneum:  No significant adenopathy.    Abdominal wall:  Small fat containing left inguinal hernia.    Vasculature: Mild atherosclerosis without aneurysm.    Bones: Bilateral L5 pars defects with grade 2 spondylolisthesis.  No aggressive osseous lesions.                               X-Ray Abdomen  AP 1 View (KUB) (Final result)  Result time 02/15/19 18:47:53    Final result by Judah Walter MD (02/15/19 18:47:53)                 Impression:      Nonobstructive bowel gas pattern.      Electronically signed by: Judah Walter MD  Date:    02/15/2019  Time:    18:47             Narrative:    EXAMINATION:  XR ABDOMEN AP 1 VIEW    CLINICAL HISTORY:  Unspecified abdominal pain    TECHNIQUE:  AP View(s) of the abdomen was performed.    COMPARISON:  None    FINDINGS:  Monitoring leads overlie the chest and abdomen.  Resolution is limited by body habitus with underpenetration.    Nonobstructive bowel gas pattern.  No organomegaly or significant mass effect.  No large amount of free air or abnormal intra-abdominal calcification definitively seen.  Suspected small bilateral pleural effusions, right greater than left as well as right basilar subsegmental scarring versus atelectasis.  Cardiac silhouette is enlarged.  No acute osseous process seen.                               X-Ray Chest 1 View (Final result)  Result time 02/15/19 18:52:56   Procedure changed from X-Ray Chest PA And Lateral     Final result by Judah Walter MD (02/15/19 18:52:56)                 Impression:      Findings suggesting pulmonary edema/CHF pattern with right greater than left small pleural effusions.  Short-term follow-up chest radiography after therapy is recommended to resolution.      Electronically signed by: Judah Walter MD  Date:    02/15/2019  Time:    18:52             Narrative:    EXAMINATION:  XR CHEST 1 VIEW    CLINICAL HISTORY:  chest pain; Chest pain, unspecified    TECHNIQUE:  Single frontal view of the chest was performed.    COMPARISON:  Abdominal radiograph same day    FINDINGS:  Monitoring leads overlie the chest.  Patient is slightly rotated.    Cardiac silhouette appears enlarged with prominence of the central pulmonary vasculature and bilateral diffuse interstitial coarsening with a perihilar and basilar predominance  suggesting pulmonary edema/CHF pattern.  Suspected small right and trace left pleural effusions with right basilar subsegmental atelectasis/infiltrate.  Prominent osseous spurring of the right anterior 1st rib.  Calcific atherosclerosis of the thoracic aorta.  Upper mediastinal contours are otherwise within normal limits.  No large pneumothorax.  No acute osseous process seen.                                MEDICATIONS/FLUIDS:  Medications   sodium chloride 0.9% bolus 1,000 mL (not administered)   oseltamivir capsule 75 mg (not administered)   HYDROmorphone injection 1 mg (not administered)   vancomycin 1750 mg in 0.9% sodium chloride 500 mL IVPB (not administered)   moxifloxacin 400 mg/250 mL IVPB 400 mg (not administered)   morphine injection 4 mg (4 mg Intravenous Given 2/15/19 1814)   HYDROmorphone injection 1 mg (1 mg Intravenous Given 2/15/19 1858)   acetaminophen tablet 1,000 mg (1,000 mg Oral Given 2/15/19 2014)   omnipaque 350 iohexol 100 mL (100 mLs Intravenous Given 2/15/19 1956)         MDM: 64 y/o ebne with hx of afib on coumadin, also hx of heart failure, presents with right sided flank pain that started acutely at 3 pm. He reports a similar episode approximately 2 days ago that resolved in about 4 hours. Differential diagnosis includes but is not limited to: kidney stone vs retroperitoneal bleed vs PNA vs pleural effusion vs pneumothorax vs PE vs Ao dissection vs pyelo. Recurrent nature of the pain and the presentation of the pt more suggestive of a kidney stone or biliary colic compared to other diagnosis. Will obtain UA, trop, CBC, CMP, chest x ray, and abdominal x ray before deciding for further management. Morphine for pain control.       EKG: afib, , no ischemic changes  WBC 20, however, the history of sudden onset of pain, no fever, does not correlate w/ an infectious symptom  patient reports hematuria 2 days ago and today    7:10 PM  Looks better, still sitting up in bed but pain only when  he moves, localizes the pain to the right flank, not chest, no LE weakness/numbness  CMP w/ mild hypoNa 128  LFTs wnl, total bili wnl  Trop wnl  Lipase wnl  cxr/abd xr: pulm edema, right pulm eff >left pulm eff      .Patient was signed-out to Dr. Bess     At 7:50 pm the change of shift with plan for:patietn w/ right flank pain, severe, sudden onset. Also hematuria. WBC 20, INR 4.2  patient received morphine and dilaudid for pain control    Pending:  UA  CT a/p  Lactate         Sandra Miranda MD  02/15/19 2516

## 2019-02-16 PROBLEM — B95.5 STREPTOCOCCAL BACTEREMIA: Status: ACTIVE | Noted: 2019-02-16

## 2019-02-16 PROBLEM — A41.9 SEPSIS DUE TO PNEUMONIA: Status: ACTIVE | Noted: 2019-02-16

## 2019-02-16 PROBLEM — J18.9 SEPSIS DUE TO PNEUMONIA: Status: ACTIVE | Noted: 2019-02-16

## 2019-02-16 PROBLEM — R78.81 STREPTOCOCCAL BACTEREMIA: Status: ACTIVE | Noted: 2019-02-16

## 2019-02-16 LAB
ANION GAP SERPL CALC-SCNC: 7 MMOL/L
ASCENDING AORTA: 3.41 CM
AV INDEX (PROSTH): 0.7
AV MEAN GRADIENT: 4.1 MMHG
AV PEAK GRADIENT: 7.08 MMHG
AV VALVE AREA: 3.16 CM2
AV VELOCITY RATIO: 0.74
BASOPHILS # BLD AUTO: 0.02 K/UL
BASOPHILS NFR BLD: 0.1 %
BSA FOR ECHO PROCEDURE: 2.11 M2
BUN SERPL-MCNC: 18 MG/DL
CALCIUM SERPL-MCNC: 9.8 MG/DL
CHLORIDE SERPL-SCNC: 102 MMOL/L
CO2 SERPL-SCNC: 21 MMOL/L
CREAT SERPL-MCNC: 0.7 MG/DL
CV ECHO LV RWT: 0.35 CM
DIFFERENTIAL METHOD: ABNORMAL
DOP CALC AO PEAK VEL: 1.33 M/S
DOP CALC AO VTI: 24.28 CM
DOP CALC LVOT AREA: 4.48 CM2
DOP CALC LVOT DIAMETER: 2.39 CM
DOP CALC LVOT PEAK VEL: 0.98 M/S
DOP CALC LVOT STROKE VOLUME: 76.72 CM3
DOP CALCLVOT PEAK VEL VTI: 17.11 CM
E WAVE DECELERATION TIME: 149.69 MSEC
E/A RATIO: 2.2
E/E' RATIO: 6.7
ECHO LV POSTERIOR WALL: 1.02 CM (ref 0.6–1.1)
EOSINOPHIL # BLD AUTO: 0 K/UL
EOSINOPHIL NFR BLD: 0.1 %
ERYTHROCYTE [DISTWIDTH] IN BLOOD BY AUTOMATED COUNT: 12.4 %
EST. GFR  (AFRICAN AMERICAN): >60 ML/MIN/1.73 M^2
EST. GFR  (NON AFRICAN AMERICAN): >60 ML/MIN/1.73 M^2
FRACTIONAL SHORTENING: 25 % (ref 28–44)
GLUCOSE SERPL-MCNC: 89 MG/DL
HCT VFR BLD AUTO: 33.3 %
HGB BLD-MCNC: 10.9 G/DL
IMM GRANULOCYTES # BLD AUTO: 0.12 K/UL
IMM GRANULOCYTES NFR BLD AUTO: 0.8 %
INFLUENZA A, MOLECULAR: NEGATIVE
INFLUENZA B, MOLECULAR: NEGATIVE
INR PPP: 4.2
INTERVENTRICULAR SEPTUM: 0.89 CM (ref 0.6–1.1)
LA MAJOR: 7.24 CM
LA MINOR: 6.54 CM
LA WIDTH: 4.91 CM
LEFT ATRIUM SIZE: 5.65 CM
LEFT ATRIUM VOLUME INDEX: 77.5 ML/M2
LEFT ATRIUM VOLUME: 162.05 CM3
LEFT INTERNAL DIMENSION IN SYSTOLE: 4.41 CM (ref 2.1–4)
LEFT VENTRICLE DIASTOLIC VOLUME INDEX: 81.12 ML/M2
LEFT VENTRICLE DIASTOLIC VOLUME: 169.61 ML
LEFT VENTRICLE MASS INDEX: 106.6 G/M2
LEFT VENTRICLE SYSTOLIC VOLUME INDEX: 42.1 ML/M2
LEFT VENTRICLE SYSTOLIC VOLUME: 88.11 ML
LEFT VENTRICULAR INTERNAL DIMENSION IN DIASTOLE: 5.85 CM (ref 3.5–6)
LEFT VENTRICULAR MASS: 222.82 G
LV LATERAL E/E' RATIO: 5.5
LV SEPTAL E/E' RATIO: 8.56
LYMPHOCYTES # BLD AUTO: 1.4 K/UL
LYMPHOCYTES NFR BLD: 8.9 %
MAGNESIUM SERPL-MCNC: 2 MG/DL
MCH RBC QN AUTO: 34.6 PG
MCHC RBC AUTO-ENTMCNC: 32.7 G/DL
MCV RBC AUTO: 106 FL
MONOCYTES # BLD AUTO: 1.7 K/UL
MONOCYTES NFR BLD: 10.8 %
MV PEAK A VEL: 0.35 M/S
MV PEAK E VEL: 0.77 M/S
NEUTROPHILS # BLD AUTO: 12.7 K/UL
NEUTROPHILS NFR BLD: 79.3 %
NRBC BLD-RTO: 0 /100 WBC
PHOSPHATE SERPL-MCNC: 2.7 MG/DL
PISA TR MAX VEL: 2.66 M/S
PLATELET # BLD AUTO: 256 K/UL
PMV BLD AUTO: 8.8 FL
POTASSIUM SERPL-SCNC: 4.3 MMOL/L
PROTHROMBIN TIME: 41.1 SEC
PULM VEIN S/D RATIO: 0.43
PV PEAK D VEL: 0.6 M/S
PV PEAK S VEL: 0.26 M/S
RA MAJOR: 6.93 CM
RA PRESSURE: 8 MMHG
RA WIDTH: 5.62 CM
RBC # BLD AUTO: 3.15 M/UL
RIGHT VENTRICULAR END-DIASTOLIC DIMENSION: 3.99 CM
RV TISSUE DOPPLER FREE WALL SYSTOLIC VELOCITY 1 (APICAL 4 CHAMBER VIEW): 9.71 M/S
SINUS: 3.4 CM
SODIUM SERPL-SCNC: 130 MMOL/L
SPECIMEN SOURCE: NORMAL
STJ: 2.7 CM
TDI LATERAL: 0.14
TDI SEPTAL: 0.09
TDI: 0.12
TR MAX PG: 28.3 MMHG
TRICUSPID ANNULAR PLANE SYSTOLIC EXCURSION: 2.05 CM
TV REST PULMONARY ARTERY PRESSURE: 36 MMHG
VANCOMYCIN SERPL-MCNC: 15.2 UG/ML
WBC # BLD AUTO: 15.93 K/UL

## 2019-02-16 PROCEDURE — 85610 PROTHROMBIN TIME: CPT

## 2019-02-16 PROCEDURE — 63600175 PHARM REV CODE 636 W HCPCS: Performed by: HOSPITALIST

## 2019-02-16 PROCEDURE — 25000003 PHARM REV CODE 250: Performed by: PHYSICIAN ASSISTANT

## 2019-02-16 PROCEDURE — 36415 COLL VENOUS BLD VENIPUNCTURE: CPT

## 2019-02-16 PROCEDURE — 83735 ASSAY OF MAGNESIUM: CPT

## 2019-02-16 PROCEDURE — 84100 ASSAY OF PHOSPHORUS: CPT

## 2019-02-16 PROCEDURE — 99233 PR SUBSEQUENT HOSPITAL CARE,LEVL III: ICD-10-PCS | Mod: ,,, | Performed by: HOSPITALIST

## 2019-02-16 PROCEDURE — 87040 BLOOD CULTURE FOR BACTERIA: CPT | Mod: 59

## 2019-02-16 PROCEDURE — 80202 ASSAY OF VANCOMYCIN: CPT

## 2019-02-16 PROCEDURE — 63600175 PHARM REV CODE 636 W HCPCS: Performed by: PHYSICIAN ASSISTANT

## 2019-02-16 PROCEDURE — 99233 SBSQ HOSP IP/OBS HIGH 50: CPT | Mod: ,,, | Performed by: HOSPITALIST

## 2019-02-16 PROCEDURE — 25000003 PHARM REV CODE 250: Performed by: EMERGENCY MEDICINE

## 2019-02-16 PROCEDURE — 87502 INFLUENZA DNA AMP PROBE: CPT

## 2019-02-16 PROCEDURE — 80048 BASIC METABOLIC PNL TOTAL CA: CPT

## 2019-02-16 PROCEDURE — 11000001 HC ACUTE MED/SURG PRIVATE ROOM

## 2019-02-16 PROCEDURE — 85025 COMPLETE CBC W/AUTO DIFF WBC: CPT

## 2019-02-16 RX ORDER — HYDROMORPHONE HYDROCHLORIDE 1 MG/ML
1.5 INJECTION, SOLUTION INTRAMUSCULAR; INTRAVENOUS; SUBCUTANEOUS ONCE
Status: COMPLETED | OUTPATIENT
Start: 2019-02-16 | End: 2019-02-16

## 2019-02-16 RX ORDER — DILTIAZEM HYDROCHLORIDE 120 MG/1
120 CAPSULE, COATED, EXTENDED RELEASE ORAL NIGHTLY
Status: DISCONTINUED | OUTPATIENT
Start: 2019-02-16 | End: 2019-02-16

## 2019-02-16 RX ORDER — DILTIAZEM HYDROCHLORIDE 240 MG/1
240 CAPSULE, COATED, EXTENDED RELEASE ORAL NIGHTLY
Status: DISCONTINUED | OUTPATIENT
Start: 2019-02-16 | End: 2019-02-17

## 2019-02-16 RX ORDER — SODIUM CHLORIDE 9 MG/ML
INJECTION, SOLUTION INTRAVENOUS CONTINUOUS
Status: ACTIVE | OUTPATIENT
Start: 2019-02-16 | End: 2019-02-16

## 2019-02-16 RX ORDER — DILTIAZEM HYDROCHLORIDE 120 MG/1
120 CAPSULE, COATED, EXTENDED RELEASE ORAL NIGHTLY
Status: COMPLETED | OUTPATIENT
Start: 2019-02-16 | End: 2019-02-16

## 2019-02-16 RX ORDER — HYDROMORPHONE HYDROCHLORIDE 1 MG/ML
1 INJECTION, SOLUTION INTRAMUSCULAR; INTRAVENOUS; SUBCUTANEOUS EVERY 4 HOURS PRN
Status: DISCONTINUED | OUTPATIENT
Start: 2019-02-16 | End: 2019-02-18

## 2019-02-16 RX ADMIN — LIDOCAINE 1 PATCH: 50 PATCH TOPICAL at 12:02

## 2019-02-16 RX ADMIN — MORPHINE SULFATE 2 MG: 2 INJECTION, SOLUTION INTRAMUSCULAR; INTRAVENOUS at 06:02

## 2019-02-16 RX ADMIN — DILTIAZEM HYDROCHLORIDE 120 MG: 120 CAPSULE, COATED, EXTENDED RELEASE ORAL at 12:02

## 2019-02-16 RX ADMIN — ACETAMINOPHEN 650 MG: 325 TABLET ORAL at 10:02

## 2019-02-16 RX ADMIN — OSELTAMIVIR PHOSPHATE 75 MG: 75 CAPSULE ORAL at 08:02

## 2019-02-16 RX ADMIN — KETOROLAC TROMETHAMINE 15 MG: 30 INJECTION, SOLUTION INTRAMUSCULAR at 03:02

## 2019-02-16 RX ADMIN — GABAPENTIN 300 MG: 300 CAPSULE ORAL at 08:02

## 2019-02-16 RX ADMIN — THERA TABS 1 TABLET: TAB at 08:02

## 2019-02-16 RX ADMIN — MORPHINE SULFATE 2 MG: 2 INJECTION, SOLUTION INTRAMUSCULAR; INTRAVENOUS at 12:02

## 2019-02-16 RX ADMIN — LEVOFLOXACIN 750 MG: 750 TABLET, FILM COATED ORAL at 08:02

## 2019-02-16 RX ADMIN — FUROSEMIDE 20 MG: 20 TABLET ORAL at 08:02

## 2019-02-16 RX ADMIN — CEFTRIAXONE 2 G: 2 INJECTION, SOLUTION INTRAVENOUS at 09:02

## 2019-02-16 RX ADMIN — VANCOMYCIN HYDROCHLORIDE 1250 MG: 100 INJECTION, POWDER, LYOPHILIZED, FOR SOLUTION INTRAVENOUS at 10:02

## 2019-02-16 RX ADMIN — DILTIAZEM HYDROCHLORIDE 240 MG: 240 CAPSULE, COATED, EXTENDED RELEASE ORAL at 08:02

## 2019-02-16 RX ADMIN — HYDROMORPHONE HYDROCHLORIDE 1 MG: 1 INJECTION, SOLUTION INTRAMUSCULAR; INTRAVENOUS; SUBCUTANEOUS at 07:02

## 2019-02-16 RX ADMIN — KETOROLAC TROMETHAMINE 15 MG: 30 INJECTION, SOLUTION INTRAMUSCULAR at 10:02

## 2019-02-16 RX ADMIN — HYDROMORPHONE HYDROCHLORIDE 1.5 MG: 1 INJECTION, SOLUTION INTRAMUSCULAR; INTRAVENOUS; SUBCUTANEOUS at 03:02

## 2019-02-16 RX ADMIN — SODIUM CHLORIDE: 0.9 INJECTION, SOLUTION INTRAVENOUS at 01:02

## 2019-02-16 RX ADMIN — HYDROMORPHONE HYDROCHLORIDE 1 MG: 1 INJECTION, SOLUTION INTRAMUSCULAR; INTRAVENOUS; SUBCUTANEOUS at 11:02

## 2019-02-16 NOTE — ED NOTES
Attempted to call report to 6th floor. RN not assigned to bed and charge RN not available. Will attempt again.

## 2019-02-16 NOTE — ED PROVIDER NOTES
This patient was signed out to me at shift change pending results of labs and a CT of the abdomen and pelvis.  The patient presented with right-sided flank pain. He had an episode of flank pain a couple days ago that resolved.  He had a similar episode that began this afternoon but reports that is more severe.  I obtained history from the patient.  He reports pain to the right anterior lower chest wall that is worse with breathing.  In fact, he reports breathing difficulty due to this pain which prompted him to come to the ED.  The patient started having fever, chills, body aches approximately 1 week ago.  He presented to an urgent care clinic 5 days ago and was diagnosed with the flu.  Tamiflu was not started due to the duration of symptoms at that time.  He was instructed on supportive management.  He has been taking over-the-counter analgesics.  His symptoms progressed and he presented to the ED at [facility] yesterday.  He had a positive flu swab at that time.  He was discharged home on continued supportive home management.  In the ED today, the patient is afebrile.  He has a white count of 20,000. There are small bilateral pleural effusions on chest radiograph.  He developed tachycardia while in the ED.  CT of the abdomen and pelvis is negative for acute intra-abdominal pathology but does show a right middle lobe consolidation.  The patient is set to receive IV antibiotics and oral Tamiflu.  I discussed his presentation and workup with Dr. Avery and he is being admitted to the hospitalist service for further evaluation and management.     Dorian Bess III, MD  02/15/19 2213

## 2019-02-16 NOTE — ASSESSMENT & PLAN NOTE
Unclear etiology but likely AGE  - low suspicion for C. Diff without recent abx or hospitalization  - check stool studies, C. diff  - c/w abx for CAP

## 2019-02-16 NOTE — ASSESSMENT & PLAN NOTE
- likely 2/2 poor PO intake and GI losses  - Given 1L NS in ED.  C/w mIVF hydration- recheck with AM labs but will give slowly with heart failure

## 2019-02-16 NOTE — ASSESSMENT & PLAN NOTE
BP controlled  - hold lisinopril to prevent hypotension  - reduced dilt dose from 240 to 120 on admission, but will continue 240 mg nightly thereafter

## 2019-02-16 NOTE — PLAN OF CARE
Problem: Infection  Goal: Infection Symptom Resolution  Outcome: Ongoing (interventions implemented as appropriate)  Airborn isolation maintained and aseptic technique discussed with patient and spouse.Verbalizes understanding.

## 2019-02-16 NOTE — H&P
"[facility] Hospital Medicine  History & Physical    Patient Name: Ed Bunch  MRN: [id number]  Admission Date: 2/15/2019  Attending Physician: Shaquille Johnson MD   Primary Care Provider: Primary Doctor [facility] Medicine Team: [facility] MED  Rich Womack PA-C     Patient information was obtained from patient, past medical records and ER records.     Subjective:     Principal Problem:Pneumonia of right middle lobe due to infectious organism    Chief Complaint:   Chief Complaint   Patient presents with    Chest Pain     Pt with right sided chest "cramping" increases with deep breath.  Pt states he has had the flu with coughing and congestion.        HPI: Ed Bunch is a 63 y.o. year old male with co-morbidities including chronic afib (on coumadin), CHF, and HTN who presents to the ED complaining of  intermittent,worsening, non-radiating right flank pain this afternoon. Pain is worse with palpation, heavy breathing and described as sharp. Pt reports prior similar episode 2 days ago, which resolved after approximately 4 hours. He states that symptoms were still present at this time, but relatively more mild. Wife at bedside states he was dx with the flu 1 week ago. Pt reports f/c/night sweats, OTERO, orthopnea, productive yellow cough x7 days, and diarrhea for 5 days.  Patient reports daughter recently flu positive and found flu positive yesterday at [facility].  Pt denies any dysuria or radiating grown pain but does report hematuria yesterday (resolved).  No hx of kidney stones. Pt states he has attempted to manage pain with tylenol with no relief. Pt reports weight loss and reduced PO intake over the last week.  No recent abx use or hospitalization.    In ED, tachypneic to 20s, 91-99% on RA. CBC shows WBC 20.94k, LA 0.9.  UA negative for UTI. INR 4.2 (aPTT 45.8). Na 128- given 1L NS IVF.  CO2 18, renal fxn baseline. CXR shows CM + pulmonary edema as well as "Suspected small right and trace left " "pleural effusions with right basilar subsegmental atelectasis/infiltrate."  CT A/P with PO/IV contrast shows no acute intraabdominal concerns.  Imaging does show "Trace dependent right pleural fluid with adjacent atelectatic change.  Consolidation in the medial segment right middle lobe.  Bandlike opacities are seen at the right lung base, likely atelectasis or scarring."    Past Medical History:   Diagnosis Date    Atrial fibrillation     Hypertension        Past Surgical History:   Procedure Laterality Date    CARDIOVERSION      COLONOSCOPY      TONSILLECTOMY         Review of patient's allergies indicates:  No Known Allergies    No current facility-administered medications on file prior to encounter.      Current Outpatient Medications on File Prior to Encounter   Medication Sig    acetaminophen (TYLENOL) 500 MG tablet Take 500 mg by mouth.    diltiaZEM (TIAZAC) 240 MG Cs24     gabapentin (NEURONTIN) 300 MG capsule TK ONE C PO  D    lisinopril 10 MG tablet TK 1 T PO  QHS    multivitamin capsule Take 1 capsule by mouth.    furosemide (LASIX) 20 MG tablet TK 1 T PO  D    mupirocin (BACTROBAN) 2 % ointment Apply topically 2 (two) times daily. AAA    warfarin (COUMADIN) 7.5 MG tablet      Family History     Problem Relation (Age of Onset)    Cancer Father, Sister    No Known Problems Mother        Tobacco Use    Smoking status: Never Smoker    Smokeless tobacco: Never Used   Substance and Sexual Activity    Alcohol use: Not on file    Drug use: Not on file    Sexual activity: Not on file     Review of Systems   Constitutional: Positive for appetite change (reduced PO intake), chills, diaphoresis, fatigue, fever and unexpected weight change (wt loss).   HENT: Negative for ear pain and sore throat.    Eyes: Negative for photophobia, pain and visual disturbance.   Respiratory: Positive for cough (productive yellow) and shortness of breath. Negative for choking, chest tightness and wheezing.  "   Cardiovascular: Negative for chest pain, palpitations and leg swelling.   Gastrointestinal: Positive for diarrhea (4-5 stools/1 day). Negative for abdominal pain, nausea and vomiting.   Endocrine: Negative for heat intolerance and polydipsia.   Genitourinary: Negative for dysuria, frequency and urgency.        Reduced urine output   Musculoskeletal: Positive for myalgias, neck pain and neck stiffness. Negative for arthralgias, back pain and gait problem.   Skin: Negative for rash and wound.   Neurological: Negative for dizziness, syncope and headaches.   Psychiatric/Behavioral: Negative for confusion. The patient is not nervous/anxious.      Objective:     Vital Signs (Most Recent):  Temp: 98.1 °F (36.7 °C) (02/15/19 2000)  Pulse: 92 (02/15/19 2200)  Resp: 19 (02/15/19 2200)  BP: 139/80 (02/15/19 2200)  SpO2: 96 % (02/15/19 2200) Vital Signs (24h Range):  Temp:  [98.1 °F (36.7 °C)-98.9 °F (37.2 °C)] 98.1 °F (36.7 °C)  Pulse:  [] 92  Resp:  [19-30] 19  SpO2:  [91 %-99 %] 96 %  BP: (121-178)/(72-86) 139/80     Weight: 88.5 kg (195 lb)  Body mass index is 27.2 kg/m².    Physical Exam   Constitutional: He is oriented to person, place, and time. He appears well-developed and well-nourished. No distress.   Healthy appearing male with notable diaphoresis in NAD   HENT:   Head: Normocephalic and atraumatic.   Eyes: Conjunctivae and EOM are normal. Pupils are equal, round, and reactive to light.   Neck: Normal range of motion. Neck supple.   Cardiovascular: Normal rate and normal heart sounds. An irregularly irregular rhythm present.   Pulmonary/Chest: Effort normal. No respiratory distress. He has wheezes. He has rales.   Diminished lungs sounds L>R  Crackles to R lower and middle lobes  Faint anterior wheezing centrally   Abdominal: Soft. Bowel sounds are normal. He exhibits no distension. There is no tenderness. There is no rebound.   Musculoskeletal: Normal range of motion.   No neck or spinal tenderness  No CVAT     Neurological: He is alert and oriented to person, place, and time.   Skin: Skin is warm. He is diaphoretic.   Psychiatric: He has a normal mood and affect. His behavior is normal.   Nursing note and vitals reviewed.        CRANIAL NERVES     CN III, IV, VI   Pupils are equal, round, and reactive to light.  Extraocular motions are normal.        Significant Labs:   CBC:   Recent Labs   Lab 02/15/19  1806   WBC 20.94*   HGB 12.2*   HCT 35.3*        CMP:   Recent Labs   Lab 02/15/19  1806   *   K 4.3   CL 99   CO2 18*   *   BUN 23   CREATININE 1.0   CALCIUM 10.5   PROT 8.2   ALBUMIN 3.3*   BILITOT 0.7   ALKPHOS 132   AST 25   ALT 29   ANIONGAP 11   EGFRNONAA >60.0     Lipase:   Recent Labs   Lab 02/15/19  1806   LIPASE 27     Troponin:   Recent Labs   Lab 02/15/19  1806   TROPONINI <0.006     Urine Studies:   Recent Labs   Lab 02/15/19  2035   COLORU Yellow   APPEARANCEUA Clear   PHUR 5.0   SPECGRAV 1.030   PROTEINUA Negative   GLUCUA Negative   KETONESU Negative   BILIRUBINUA Negative   OCCULTUA Negative   NITRITE Negative   LEUKOCYTESUR Negative   RBCUA 3   WBCUA 2   BACTERIA Rare   HYALINECASTS 1       Significant Imaging: I have reviewed all pertinent imaging results/findings within the past 24 hours.     X-ray Chest 1 View    Result Date: 2/15/2019  EXAMINATION: XR CHEST 1 VIEW CLINICAL HISTORY: chest pain; Chest pain, unspecified TECHNIQUE: Single frontal view of the chest was performed. COMPARISON: Abdominal radiograph same day FINDINGS: Monitoring leads overlie the chest.  Patient is slightly rotated. Cardiac silhouette appears enlarged with prominence of the central pulmonary vasculature and bilateral diffuse interstitial coarsening with a perihilar and basilar predominance suggesting pulmonary edema/CHF pattern.  Suspected small right and trace left pleural effusions with right basilar subsegmental atelectasis/infiltrate.  Prominent osseous spurring of the right anterior 1st rib.   Calcific atherosclerosis of the thoracic aorta.  Upper mediastinal contours are otherwise within normal limits.  No large pneumothorax.  No acute osseous process seen.     Findings suggesting pulmonary edema/CHF pattern with right greater than left small pleural effusions.  Short-term follow-up chest radiography after therapy is recommended to resolution.     X-ray Abdomen Ap 1 View (kub)    Result Date: 2/15/2019  EXAMINATION: XR ABDOMEN AP 1 VIEW CLINICAL HISTORY: Unspecified abdominal pain TECHNIQUE: AP View(s) of the abdomen was performed. COMPARISON: None FINDINGS: Monitoring leads overlie the chest and abdomen.  Resolution is limited by body habitus with underpenetration. Nonobstructive bowel gas pattern.  No organomegaly or significant mass effect.  No large amount of free air or abnormal intra-abdominal calcification definitively seen.  Suspected small bilateral pleural effusions, right greater than left as well as right basilar subsegmental scarring versus atelectasis.  Cardiac silhouette is enlarged.  No acute osseous process seen.     Nonobstructive bowel gas pattern.    Ct Abdomen Pelvis With Contrast    Result Date: 2/15/2019  EXAMINATION: CT ABDOMEN PELVIS WITH CONTRAST CLINICAL HISTORY: severe right flank pain, hx of a. fib on coumadin, r/o bleed vs ischemia; TECHNIQUE: Low dose axial images, sagittal and coronal reformations were obtained from the lung bases to the pubic symphysis following the IV administration of 100 mL of Omnipaque 350 and the oral administration of 30 mL of Omnipaque 350. COMPARISON: None. FINDINGS: Heart: Normal in size. No pericardial effusion. Lung Bases: Trace dependent right pleural fluid with adjacent atelectatic change.  Consolidation in the medial segment right middle lobe.  Bandlike opacities are seen at the right lung base, likely atelectasis or scarring. Liver: Normal in size and attenuation, with no focal hepatic lesions. Gallbladder: No calcified gallstones. Bile  Ducts: No evidence of dilated ducts. Pancreas: No mass or peripancreatic fat stranding. Spleen: Unremarkable. Adrenals: Hypoattenuating small nodule in the right adrenal gland consistent with probable myelolipoma.  Subcentimeter left adrenal nodule consistent with probable adenoma. Kidneys/ Ureters: Normal in size and location. Normal concentration and excretion of contrast. No hydronephrosis or nephrolithiasis. No ureteral dilatation. Bladder: No evidence of wall thickening. Reproductive organs: Unremarkable. GI Tract/Mesentery: Sigmoid diverticulosis without evidence of acute diverticulitis.  No evidence of bowel obstruction or inflammation. Appendix visualized and unremarkable. Peritoneal Space: No ascites. No free air. Retroperitoneum:  No significant adenopathy. Abdominal wall:  Small fat containing left inguinal hernia. Vasculature: Mild atherosclerosis without aneurysm. Bones: Bilateral L5 pars defects with grade 2 spondylolisthesis.  No aggressive osseous lesions.     No acute intra-abdominal findings. Sigmoid diverticulosis without evidence of acute diverticulitis. Bilateral L5 pars defects with grade 2 spondylolisthesis. Right adrenal probable small myelolipoma.  Left adrenal probable small adenoma. Trace dependent right pleural fluid with adjacent atelectatic change.  Consolidation in the medial segment right middle lobe.  Bandlike opacities are seen at the right lung base, likely atelectasis or scarring.    Assessment/Plan:     * Pneumonia of right middle lobe due to infectious organism    Leukocytosis, symptomatic with productive yellow cough.  CT A/P confirms CXR findings. On admission, on RA without respiratory distress.  - Recent exposure to flu A and recent dx- will treat with tamiflu; droplet precautions  - possible CAP- will treat with levaquin  - concern for post flu MRSA- will treat with vancomycin  - R flank pain likely referred pain from pneumonia vs pneumonitis (no evidence of stone)  - Prudencio  score 2.5 for PE (HR and hemoptysis) but low suspicion clinically for PE and on warfarin (INR elevated).  - lidocaine patch, morphine for pain control     Acute on chronic heart failure    - No evidence of hypervolemia on exam, but pulmonary edema on CXR +orthopnea  - compensated but would benefit from diuretic. Will hold off on treatment as patient on RA, hyponatremic, and currently receiving tx for CAP  - No echo on file- will check in AM  - continue dilt; hold ACEI, diuretic (lasix PRN) to prevent hypovolemia  - monitor response with daily weights, strict I/Os, oxygen requirements  - Na restricted/fluid restricted diet 1500 ml     A-fib    Current use of long term anticoagulation  Rate controlled with diltiazem 240 mg PO nightly; AC with warfarin  - supratherapeutic INR of 4.2; will hold warfarin and check INR daily (INR 5 at last visit)  - f/w cardiology through [facility]  - [facility] 2  - monitor on tele; check lytes     Diarrhea    Unclear etiology but likely AGE  - low suspicion for C. Diff without recent abx or hospitalization  - check stool studies, C. diff  - c/w abx for CAP     Hyponatremia    - likely 2/2 poor PO intake and GI losses  - Given 1L NS in ED.  C/w mIVF hydration- recheck with AM labs but will give slowly with heart failure     HTN (hypertension)    BP controlled  - hold lisinopril to prevent hypotension  - reduced dilt dose from 240 to 120 on admission, but will continue 240 mg nightly thereafter        VTE Risk Mitigation (From admission, onward)        Ordered     IP VTE LOW RISK PATIENT  Once      02/15/19 2341     Place JOVANNA hose  Until discontinued      02/15/19 2244     Place sequential compression device  Until discontinued      02/15/19 2244             KRISTIN DelgadoC  Department of Hospital Medicine   [facility]

## 2019-02-16 NOTE — ASSESSMENT & PLAN NOTE
- No evidence of hypervolemia on exam, but pulmonary edema on CXR +orthopnea  - compensated but would benefit from diuretic. Will hold off on treatment as patient on RA, hyponatremic, and currently receiving tx for CAP  - No echo on file- will check in AM  - continue dilt; hold ACEI, diuretic (lasix PRN) to prevent hypovolemia  - monitor response with daily weights, strict I/Os, oxygen requirements  - Na restricted/fluid restricted diet 1500 ml

## 2019-02-16 NOTE — ASSESSMENT & PLAN NOTE
Leukocytosis, symptomatic with productive yellow cough.  CT A/P confirms CXR findings. On admission, on RA without respiratory distress.  - Recent exposure to flu A and recent dx- will treat with tamiflu; droplet precautions  - possible CAP- will treat with levaquin  - concern for post flu MRSA- will treat with vancomycin  - R flank pain likely referred pain from pneumonia vs pneumonitis (no evidence of stone)  - Wells score 2.5 for PE (HR and hemoptysis) but low suspicion clinically for PE and on warfarin (INR elevated).  - lidocaine patch, morphine for pain control

## 2019-02-16 NOTE — SUBJECTIVE & OBJECTIVE
Past Medical History:   Diagnosis Date    Atrial fibrillation     Hypertension        Past Surgical History:   Procedure Laterality Date    CARDIOVERSION      COLONOSCOPY      TONSILLECTOMY         Review of patient's allergies indicates:  No Known Allergies    No current facility-administered medications on file prior to encounter.      Current Outpatient Medications on File Prior to Encounter   Medication Sig    acetaminophen (TYLENOL) 500 MG tablet Take 500 mg by mouth.    diltiaZEM (TIAZAC) 240 MG Cs24     gabapentin (NEURONTIN) 300 MG capsule TK ONE C PO  D    lisinopril 10 MG tablet TK 1 T PO  QHS    multivitamin capsule Take 1 capsule by mouth.    furosemide (LASIX) 20 MG tablet TK 1 T PO  D    mupirocin (BACTROBAN) 2 % ointment Apply topically 2 (two) times daily. AAA    warfarin (COUMADIN) 7.5 MG tablet      Family History     Problem Relation (Age of Onset)    Cancer Father, Sister    No Known Problems Mother        Tobacco Use    Smoking status: Never Smoker    Smokeless tobacco: Never Used   Substance and Sexual Activity    Alcohol use: Not on file    Drug use: Not on file    Sexual activity: Not on file     Review of Systems   Constitutional: Positive for appetite change (reduced PO intake), chills, diaphoresis, fatigue, fever and unexpected weight change (wt loss).   HENT: Negative for ear pain and sore throat.    Eyes: Negative for photophobia, pain and visual disturbance.   Respiratory: Positive for cough (productive yellow) and shortness of breath. Negative for choking, chest tightness and wheezing.    Cardiovascular: Negative for chest pain, palpitations and leg swelling.   Gastrointestinal: Positive for diarrhea (4-5 stools/1 day). Negative for abdominal pain, nausea and vomiting.   Endocrine: Negative for heat intolerance and polydipsia.   Genitourinary: Negative for dysuria, frequency and urgency.        Reduced urine output   Musculoskeletal: Positive for myalgias, neck pain  and neck stiffness. Negative for arthralgias, back pain and gait problem.   Skin: Negative for rash and wound.   Neurological: Negative for dizziness, syncope and headaches.   Psychiatric/Behavioral: Negative for confusion. The patient is not nervous/anxious.      Objective:     Vital Signs (Most Recent):  Temp: 98.1 °F (36.7 °C) (02/15/19 2000)  Pulse: 92 (02/15/19 2200)  Resp: 19 (02/15/19 2200)  BP: 139/80 (02/15/19 2200)  SpO2: 96 % (02/15/19 2200) Vital Signs (24h Range):  Temp:  [98.1 °F (36.7 °C)-98.9 °F (37.2 °C)] 98.1 °F (36.7 °C)  Pulse:  [] 92  Resp:  [19-30] 19  SpO2:  [91 %-99 %] 96 %  BP: (121-178)/(72-86) 139/80     Weight: 88.5 kg (195 lb)  Body mass index is 27.2 kg/m².    Physical Exam   Constitutional: He is oriented to person, place, and time. He appears well-developed and well-nourished. No distress.   Healthy appearing male with notable diaphoresis in NAD   HENT:   Head: Normocephalic and atraumatic.   Eyes: Conjunctivae and EOM are normal. Pupils are equal, round, and reactive to light.   Neck: Normal range of motion. Neck supple.   Cardiovascular: Normal rate and normal heart sounds. An irregularly irregular rhythm present.   Pulmonary/Chest: Effort normal. No respiratory distress. He has wheezes. He has rales.   Diminished lungs sounds L>R  Crackles to R lower and middle lobes  Faint anterior wheezing centrally   Abdominal: Soft. Bowel sounds are normal. He exhibits no distension. There is no tenderness. There is no rebound.   Musculoskeletal: Normal range of motion.   No neck or spinal tenderness  No CVAT    Neurological: He is alert and oriented to person, place, and time.   Skin: Skin is warm. He is diaphoretic.   Psychiatric: He has a normal mood and affect. His behavior is normal.   Nursing note and vitals reviewed.        CRANIAL NERVES     CN III, IV, VI   Pupils are equal, round, and reactive to light.  Extraocular motions are normal.        Significant Labs:   CBC:   Recent  Labs   Lab 02/15/19  1806   WBC 20.94*   HGB 12.2*   HCT 35.3*        CMP:   Recent Labs   Lab 02/15/19  1806   *   K 4.3   CL 99   CO2 18*   *   BUN 23   CREATININE 1.0   CALCIUM 10.5   PROT 8.2   ALBUMIN 3.3*   BILITOT 0.7   ALKPHOS 132   AST 25   ALT 29   ANIONGAP 11   EGFRNONAA >60.0     Lipase:   Recent Labs   Lab 02/15/19  1806   LIPASE 27     Troponin:   Recent Labs   Lab 02/15/19  1806   TROPONINI <0.006     Urine Studies:   Recent Labs   Lab 02/15/19  2035   COLORU Yellow   APPEARANCEUA Clear   PHUR 5.0   SPECGRAV 1.030   PROTEINUA Negative   GLUCUA Negative   KETONESU Negative   BILIRUBINUA Negative   OCCULTUA Negative   NITRITE Negative   LEUKOCYTESUR Negative   RBCUA 3   WBCUA 2   BACTERIA Rare   HYALINECASTS 1       Significant Imaging: I have reviewed all pertinent imaging results/findings within the past 24 hours.     X-ray Chest 1 View    Result Date: 2/15/2019  EXAMINATION: XR CHEST 1 VIEW CLINICAL HISTORY: chest pain; Chest pain, unspecified TECHNIQUE: Single frontal view of the chest was performed. COMPARISON: Abdominal radiograph same day FINDINGS: Monitoring leads overlie the chest.  Patient is slightly rotated. Cardiac silhouette appears enlarged with prominence of the central pulmonary vasculature and bilateral diffuse interstitial coarsening with a perihilar and basilar predominance suggesting pulmonary edema/CHF pattern.  Suspected small right and trace left pleural effusions with right basilar subsegmental atelectasis/infiltrate.  Prominent osseous spurring of the right anterior 1st rib.  Calcific atherosclerosis of the thoracic aorta.  Upper mediastinal contours are otherwise within normal limits.  No large pneumothorax.  No acute osseous process seen.     Findings suggesting pulmonary edema/CHF pattern with right greater than left small pleural effusions.  Short-term follow-up chest radiography after therapy is recommended to resolution.     X-ray Abdomen Ap 1 View  (kub)    Result Date: 2/15/2019  EXAMINATION: XR ABDOMEN AP 1 VIEW CLINICAL HISTORY: Unspecified abdominal pain TECHNIQUE: AP View(s) of the abdomen was performed. COMPARISON: None FINDINGS: Monitoring leads overlie the chest and abdomen.  Resolution is limited by body habitus with underpenetration. Nonobstructive bowel gas pattern.  No organomegaly or significant mass effect.  No large amount of free air or abnormal intra-abdominal calcification definitively seen.  Suspected small bilateral pleural effusions, right greater than left as well as right basilar subsegmental scarring versus atelectasis.  Cardiac silhouette is enlarged.  No acute osseous process seen.     Nonobstructive bowel gas pattern.    Ct Abdomen Pelvis With Contrast    Result Date: 2/15/2019  EXAMINATION: CT ABDOMEN PELVIS WITH CONTRAST CLINICAL HISTORY: severe right flank pain, hx of a. fib on coumadin, r/o bleed vs ischemia; TECHNIQUE: Low dose axial images, sagittal and coronal reformations were obtained from the lung bases to the pubic symphysis following the IV administration of 100 mL of Omnipaque 350 and the oral administration of 30 mL of Omnipaque 350. COMPARISON: None. FINDINGS: Heart: Normal in size. No pericardial effusion. Lung Bases: Trace dependent right pleural fluid with adjacent atelectatic change.  Consolidation in the medial segment right middle lobe.  Bandlike opacities are seen at the right lung base, likely atelectasis or scarring. Liver: Normal in size and attenuation, with no focal hepatic lesions. Gallbladder: No calcified gallstones. Bile Ducts: No evidence of dilated ducts. Pancreas: No mass or peripancreatic fat stranding. Spleen: Unremarkable. Adrenals: Hypoattenuating small nodule in the right adrenal gland consistent with probable myelolipoma.  Subcentimeter left adrenal nodule consistent with probable adenoma. Kidneys/ Ureters: Normal in size and location. Normal concentration and excretion of contrast. No  hydronephrosis or nephrolithiasis. No ureteral dilatation. Bladder: No evidence of wall thickening. Reproductive organs: Unremarkable. GI Tract/Mesentery: Sigmoid diverticulosis without evidence of acute diverticulitis.  No evidence of bowel obstruction or inflammation. Appendix visualized and unremarkable. Peritoneal Space: No ascites. No free air. Retroperitoneum:  No significant adenopathy. Abdominal wall:  Small fat containing left inguinal hernia. Vasculature: Mild atherosclerosis without aneurysm. Bones: Bilateral L5 pars defects with grade 2 spondylolisthesis.  No aggressive osseous lesions.     No acute intra-abdominal findings. Sigmoid diverticulosis without evidence of acute diverticulitis. Bilateral L5 pars defects with grade 2 spondylolisthesis. Right adrenal probable small myelolipoma.  Left adrenal probable small adenoma. Trace dependent right pleural fluid with adjacent atelectatic change.  Consolidation in the medial segment right middle lobe.  Bandlike opacities are seen at the right lung base, likely atelectasis or scarring.

## 2019-02-17 LAB
ANION GAP SERPL CALC-SCNC: 7 MMOL/L
BASOPHILS # BLD AUTO: 0.02 K/UL
BASOPHILS NFR BLD: 0.2 %
BUN SERPL-MCNC: 13 MG/DL
CALCIUM SERPL-MCNC: 10.1 MG/DL
CHLORIDE SERPL-SCNC: 103 MMOL/L
CO2 SERPL-SCNC: 24 MMOL/L
CREAT SERPL-MCNC: 0.7 MG/DL
DIFFERENTIAL METHOD: ABNORMAL
EOSINOPHIL # BLD AUTO: 0.1 K/UL
EOSINOPHIL NFR BLD: 0.6 %
ERYTHROCYTE [DISTWIDTH] IN BLOOD BY AUTOMATED COUNT: 12.2 %
EST. GFR  (AFRICAN AMERICAN): >60 ML/MIN/1.73 M^2
EST. GFR  (NON AFRICAN AMERICAN): >60 ML/MIN/1.73 M^2
GLUCOSE SERPL-MCNC: 94 MG/DL
HCT VFR BLD AUTO: 33.1 %
HGB BLD-MCNC: 11.2 G/DL
IMM GRANULOCYTES # BLD AUTO: 0.13 K/UL
IMM GRANULOCYTES NFR BLD AUTO: 1.2 %
INR PPP: 2.6
LYMPHOCYTES # BLD AUTO: 2 K/UL
LYMPHOCYTES NFR BLD: 18.8 %
MCH RBC QN AUTO: 35.6 PG
MCHC RBC AUTO-ENTMCNC: 33.8 G/DL
MCV RBC AUTO: 105 FL
MONOCYTES # BLD AUTO: 1.4 K/UL
MONOCYTES NFR BLD: 12.7 %
NEUTROPHILS # BLD AUTO: 7.2 K/UL
NEUTROPHILS NFR BLD: 66.5 %
NRBC BLD-RTO: 0 /100 WBC
PLATELET # BLD AUTO: 275 K/UL
PMV BLD AUTO: 8.7 FL
POTASSIUM SERPL-SCNC: 4.5 MMOL/L
PROTHROMBIN TIME: 25.4 SEC
RBC # BLD AUTO: 3.15 M/UL
SODIUM SERPL-SCNC: 134 MMOL/L
VANCOMYCIN TROUGH SERPL-MCNC: 7.9 UG/ML
WBC # BLD AUTO: 10.82 K/UL

## 2019-02-17 PROCEDURE — 85610 PROTHROMBIN TIME: CPT

## 2019-02-17 PROCEDURE — 25000003 PHARM REV CODE 250: Performed by: HOSPITALIST

## 2019-02-17 PROCEDURE — 25000003 PHARM REV CODE 250: Performed by: PHYSICIAN ASSISTANT

## 2019-02-17 PROCEDURE — 80048 BASIC METABOLIC PNL TOTAL CA: CPT

## 2019-02-17 PROCEDURE — 85025 COMPLETE CBC W/AUTO DIFF WBC: CPT

## 2019-02-17 PROCEDURE — 99499 NO LOS: ICD-10-PCS | Mod: ,,, | Performed by: PHYSICIAN ASSISTANT

## 2019-02-17 PROCEDURE — 99233 PR SUBSEQUENT HOSPITAL CARE,LEVL III: ICD-10-PCS | Mod: ,,, | Performed by: HOSPITALIST

## 2019-02-17 PROCEDURE — 99233 PR SUBSEQUENT HOSPITAL CARE,LEVL III: ICD-10-PCS | Mod: ,,, | Performed by: INTERNAL MEDICINE

## 2019-02-17 PROCEDURE — 99233 SBSQ HOSP IP/OBS HIGH 50: CPT | Mod: ,,, | Performed by: INTERNAL MEDICINE

## 2019-02-17 PROCEDURE — 25000003 PHARM REV CODE 250: Performed by: EMERGENCY MEDICINE

## 2019-02-17 PROCEDURE — 99499 UNLISTED E&M SERVICE: CPT | Mod: ,,, | Performed by: PHYSICIAN ASSISTANT

## 2019-02-17 PROCEDURE — 63600175 PHARM REV CODE 636 W HCPCS: Performed by: HOSPITALIST

## 2019-02-17 PROCEDURE — 80202 ASSAY OF VANCOMYCIN: CPT

## 2019-02-17 PROCEDURE — 11000001 HC ACUTE MED/SURG PRIVATE ROOM

## 2019-02-17 PROCEDURE — 36415 COLL VENOUS BLD VENIPUNCTURE: CPT

## 2019-02-17 PROCEDURE — 99233 SBSQ HOSP IP/OBS HIGH 50: CPT | Mod: ,,, | Performed by: HOSPITALIST

## 2019-02-17 RX ORDER — VANCOMYCIN 1.75 GRAM/500 ML IN 0.9 % SODIUM CHLORIDE INTRAVENOUS
1750
Status: DISCONTINUED | OUTPATIENT
Start: 2019-02-18 | End: 2019-02-18

## 2019-02-17 RX ORDER — WARFARIN SODIUM 5 MG/1
5 TABLET ORAL DAILY
Status: DISCONTINUED | OUTPATIENT
Start: 2019-02-17 | End: 2019-02-19 | Stop reason: HOSPADM

## 2019-02-17 RX ADMIN — HYDROMORPHONE HYDROCHLORIDE 1 MG: 1 INJECTION, SOLUTION INTRAMUSCULAR; INTRAVENOUS; SUBCUTANEOUS at 02:02

## 2019-02-17 RX ADMIN — CEFTRIAXONE 2 G: 2 INJECTION, SOLUTION INTRAVENOUS at 07:02

## 2019-02-17 RX ADMIN — WARFARIN SODIUM 5 MG: 5 TABLET ORAL at 05:02

## 2019-02-17 RX ADMIN — HYDROMORPHONE HYDROCHLORIDE 1 MG: 1 INJECTION, SOLUTION INTRAMUSCULAR; INTRAVENOUS; SUBCUTANEOUS at 08:02

## 2019-02-17 RX ADMIN — LIDOCAINE 1 PATCH: 50 PATCH TOPICAL at 02:02

## 2019-02-17 RX ADMIN — GABAPENTIN 300 MG: 300 CAPSULE ORAL at 10:02

## 2019-02-17 RX ADMIN — DILTIAZEM HYDROCHLORIDE 240 MG: 240 CAPSULE, COATED, EXTENDED RELEASE ORAL at 08:02

## 2019-02-17 RX ADMIN — HYDROMORPHONE HYDROCHLORIDE 1 MG: 1 INJECTION, SOLUTION INTRAMUSCULAR; INTRAVENOUS; SUBCUTANEOUS at 07:02

## 2019-02-17 RX ADMIN — FUROSEMIDE 20 MG: 20 TABLET ORAL at 09:02

## 2019-02-17 RX ADMIN — OSELTAMIVIR PHOSPHATE 75 MG: 75 CAPSULE ORAL at 10:02

## 2019-02-17 RX ADMIN — THERA TABS 1 TABLET: TAB at 10:02

## 2019-02-17 RX ADMIN — OSELTAMIVIR PHOSPHATE 75 MG: 75 CAPSULE ORAL at 08:02

## 2019-02-17 NOTE — CONSULTS
[facility]  Infectious Disease  Consult Note    Patient Name: Ed Bunch  MRN: [id number]  Admission Date: 2/15/2019  Hospital Length of Stay: 2 days  Attending Physician: Martin Singh MD  Primary Care Provider: Primary Doctor No   .      Inpatient consult to Infectious Diseases  Consult performed by: NURY Byrd Jr.  Consult ordered by: Martin Singh MD      Consult recerved.  Full consult to follow.      NURY Powell  Infectious Disease  [facility]

## 2019-02-17 NOTE — SUBJECTIVE & OBJECTIVE
Past Medical History:   Diagnosis Date    Atrial fibrillation     Hypertension        Past Surgical History:   Procedure Laterality Date    CARDIOVERSION      COLONOSCOPY      TONSILLECTOMY         Review of patient's allergies indicates:  No Known Allergies    Medications:  Medications Prior to Admission   Medication Sig    acetaminophen (TYLENOL) 500 MG tablet Take 500 mg by mouth.    diltiaZEM (TIAZAC) 240 MG Cs24     gabapentin (NEURONTIN) 300 MG capsule TK ONE C PO  D    lisinopril 10 MG tablet TK 1 T PO  QHS    multivitamin capsule Take 1 capsule by mouth.    furosemide (LASIX) 20 MG tablet TK 1 T PO  D    mupirocin (BACTROBAN) 2 % ointment Apply topically 2 (two) times daily. AAA    warfarin (COUMADIN) 7.5 MG tablet      Antibiotics (From admission, onward)    Start     Stop Route Frequency Ordered    02/16/19 1846  cefTRIAXone (ROCEPHIN) 2 g in dextrose 5 % 50 mL IVPB      -- IV Every 24 hours (non-standard times) 02/16/19 1847        Antifungals (From admission, onward)    None        Antivirals (From admission, onward)        Stop Route Frequency     Tamiflu      02/20 2059 Oral 2 times daily           Immunization History   Administered Date(s) Administered    Td (ADULT) 08/31/2018       Family History     Problem Relation (Age of Onset)    Cancer Father, Sister    No Known Problems Mother        Social History     Socioeconomic History    Marital status:      Spouse name: Not on file    Number of children: Not on file    Years of education: Not on file    Highest education level: Not on file   Social Needs    Financial resource strain: Not on file    Food insecurity - worry: Not on file    Food insecurity - inability: Not on file    Transportation needs - medical: Not on file    Transportation needs - non-medical: Not on file   Occupational History    Not on file   Tobacco Use    Smoking status: Never Smoker    Smokeless tobacco: Never Used   Substance and Sexual Activity     Alcohol use: Not on file    Drug use: Not on file    Sexual activity: Not on file   Other Topics Concern    Not on file   Social History Narrative    Not on file     Review of Systems   Constitutional: Negative for appetite change, chills, diaphoresis, fatigue, fever and unexpected weight change.   HENT: Negative for congestion, ear pain, sore throat and tinnitus.    Eyes: Negative for pain, redness and visual disturbance.   Respiratory: Positive for cough. Negative for shortness of breath and wheezing.    Cardiovascular: Positive for chest pain. Negative for palpitations and leg swelling.   Gastrointestinal: Negative for abdominal pain, constipation, diarrhea, rectal pain and vomiting.   Endocrine: Negative for cold intolerance and heat intolerance.   Genitourinary: Negative for dysuria, flank pain, frequency, hematuria and urgency.   Musculoskeletal: Negative for arthralgias, back pain, myalgias and neck pain.   Skin: Negative for rash.   Allergic/Immunologic: Negative for immunocompromised state.   Neurological: Negative for dizziness, light-headedness, numbness and headaches.   Hematological: Negative for adenopathy. Does not bruise/bleed easily.   Psychiatric/Behavioral: Negative for confusion and sleep disturbance. The patient is not nervous/anxious.      Objective:     Vital Signs (Most Recent):  Temp: 98.5 °F (36.9 °C) (02/17/19 1610)  Pulse: (!) 113 (02/17/19 1610)  Resp: 18 (02/17/19 1610)  BP: 125/66 (02/17/19 1610)  SpO2: 96 % (02/17/19 1610) Vital Signs (24h Range):  Temp:  [96.5 °F (35.8 °C)-98.9 °F (37.2 °C)] 98.5 °F (36.9 °C)  Pulse:  [] 113  Resp:  [16-20] 18  SpO2:  [92 %-96 %] 96 %  BP: (107-133)/(66-80) 125/66     Weight: 89 kg (196 lb 3.4 oz)  Body mass index is 27.37 kg/m².    Estimated Creatinine Clearance: 118 mL/min (based on SCr of 0.7 mg/dL).    Physical Exam   Constitutional: He is oriented to person, place, and time. He appears well-developed and well-nourished. No  distress.   HENT:   Head: Normocephalic and atraumatic.   Cardiovascular: Normal rate, regular rhythm and normal heart sounds. Exam reveals no gallop and no friction rub.   No murmur heard.  Pulmonary/Chest: Effort normal. No respiratory distress. He has decreased breath sounds in the right middle field and the right lower field. He has no wheezes. He has no rhonchi. He has rales in the right middle field and the right lower field.   Abdominal: Soft. Bowel sounds are normal. He exhibits no distension and no mass. There is no tenderness. There is no rebound and no guarding.   Musculoskeletal: He exhibits no edema.   Neurological: He is alert and oriented to person, place, and time.   Skin: Skin is warm and dry. He is not diaphoretic.   Psychiatric: He has a normal mood and affect. His behavior is normal.       Significant Labs:   Blood Culture:   Recent Labs   Lab 02/15/19  1926 02/15/19  1939 02/16/19  1943 02/16/19  1949   LABBLOO Gram stain aer bottle: Gram positive cocci in chains resembling Strep  Gram stain keron bottle: Gram positive cocci in chains resembling Strep  Results called to and read back by: Xavi Borja RN  02/16/2019  10:39  STREPTOCOCCUS PNEUMONIAE  Susceptibility pending   Gram stain aer bottle: Gram positive cocci in chains resembling Strep   Results called to and read back by: Xavi Borja RN  02/16/2019  10:39  STREPTOCOCCUS PNEUMONIAE  For susceptibility see order #[id number]   No Growth to date No Growth to date     CBC:   Recent Labs   Lab 02/15/19  1806 02/16/19  0445 02/17/19  0319   WBC 20.94* 15.93* 10.82   HGB 12.2* 10.9* 11.2*   HCT 35.3* 33.3* 33.1*    256 275     CMP:   Recent Labs   Lab 02/15/19  1806 02/16/19  0445 02/17/19  0318   * 130* 134*   K 4.3 4.3 4.5   CL 99 102 103   CO2 18* 21* 24   * 89 94   BUN 23 18 13   CREATININE 1.0 0.7 0.7   CALCIUM 10.5 9.8 10.1   PROT 8.2  --   --    ALBUMIN 3.3*  --   --    BILITOT 0.7  --   --    ALKPHOS 132  --    --    AST 25  --   --    ALT 29  --   --    ANIONGAP 11 7* 7*   EGFRNONAA >60.0 >60.0 >60.0     All pertinent labs within the past 24 hours have been reviewed.    Significant Imaging: I have reviewed all pertinent imaging results/findings within the past 24 hours.   Procedure Component Value Units Date/Time   CT Abdomen Pelvis With Contrast [[id number]] Resulted: 02/15/19 2112   Order Status: Completed Updated: 02/15/19 2115   Narrative:     EXAMINATION:  CT ABDOMEN PELVIS WITH CONTRAST    CLINICAL HISTORY:  severe right flank pain, hx of a. fib on coumadin, r/o bleed vs ischemia;    TECHNIQUE:  Low dose axial images, sagittal and coronal reformations were obtained from the lung bases to the pubic symphysis following the IV administration of 100 mL of Omnipaque 350 and the oral administration of 30 mL of Omnipaque 350.    COMPARISON:  None.    FINDINGS:  Heart: Normal in size. No pericardial effusion.    Lung Bases: Trace dependent right pleural fluid with adjacent atelectatic change.  Consolidation in the medial segment right middle lobe.  Bandlike opacities are seen at the right lung base, likely atelectasis or scarring.    Liver: Normal in size and attenuation, with no focal hepatic lesions.    Gallbladder: No calcified gallstones.    Bile Ducts: No evidence of dilated ducts.    Pancreas: No mass or peripancreatic fat stranding.    Spleen: Unremarkable.    Adrenals: Hypoattenuating small nodule in the right adrenal gland consistent with probable myelolipoma.  Subcentimeter left adrenal nodule consistent with probable adenoma.    Kidneys/ Ureters: Normal in size and location. Normal concentration and excretion of contrast. No hydronephrosis or nephrolithiasis. No ureteral dilatation.    Bladder: No evidence of wall thickening.    Reproductive organs: Unremarkable.    GI Tract/Mesentery: Sigmoid diverticulosis without evidence of acute diverticulitis.  No evidence of bowel obstruction or inflammation. Appendix  visualized and unremarkable.    Peritoneal Space: No ascites. No free air.    Retroperitoneum:  No significant adenopathy.    Abdominal wall:  Small fat containing left inguinal hernia.    Vasculature: Mild atherosclerosis without aneurysm.    Bones: Bilateral L5 pars defects with grade 2 spondylolisthesis.  No aggressive osseous lesions.   Impression:       No acute intra-abdominal findings.    Sigmoid diverticulosis without evidence of acute diverticulitis.    Bilateral L5 pars defects with grade 2 spondylolisthesis.    Right adrenal probable small myelolipoma.  Left adrenal probable small adenoma.    Trace dependent right pleural fluid with adjacent atelectatic change.  Consolidation in the medial segment right middle lobe.  Bandlike opacities are seen at the right lung base, likely atelectasis or scarring.    Electronically signed by resident: Rusty Brothers  Date: 02/15/2019  Time: 20:22    Electronically signed by: Yoni Veliz MD  Date: 02/15/2019  Time: 21:12   X-Ray Chest 1 View [[id number]] Resulted: 02/15/19 1852   Order Status: Completed Updated: 02/15/19 1855   Narrative:     EXAMINATION:  XR CHEST 1 VIEW    CLINICAL HISTORY:  chest pain; Chest pain, unspecified    TECHNIQUE:  Single frontal view of the chest was performed.    COMPARISON:  Abdominal radiograph same day    FINDINGS:  Monitoring leads overlie the chest.  Patient is slightly rotated.    Cardiac silhouette appears enlarged with prominence of the central pulmonary vasculature and bilateral diffuse interstitial coarsening with a perihilar and basilar predominance suggesting pulmonary edema/CHF pattern.  Suspected small right and trace left pleural effusions with right basilar subsegmental atelectasis/infiltrate.  Prominent osseous spurring of the right anterior 1st rib.  Calcific atherosclerosis of the thoracic aorta.  Upper mediastinal contours are otherwise within normal limits.  No large pneumothorax.  No acute osseous process seen.    Impression:       Findings suggesting pulmonary edema/CHF pattern with right greater than left small pleural effusions.  Short-term follow-up chest radiography after therapy is recommended to resolution.      Electronically signed by: Judah Walter MD  Date: 02/15/2019  Time: 18:52   X-Ray Abdomen AP 1 View (KUB) [[id number]] Resulted: 02/15/19 1847   Order Status: Completed Updated: 02/15/19 1850   Narrative:     EXAMINATION:  XR ABDOMEN AP 1 VIEW    CLINICAL HISTORY:  Unspecified abdominal pain    TECHNIQUE:  AP View(s) of the abdomen was performed.    COMPARISON:  None    FINDINGS:  Monitoring leads overlie the chest and abdomen.  Resolution is limited by body habitus with underpenetration.    Nonobstructive bowel gas pattern.  No organomegaly or significant mass effect.  No large amount of free air or abnormal intra-abdominal calcification definitively seen.  Suspected small bilateral pleural effusions, right greater than left as well as right basilar subsegmental scarring versus atelectasis.  Cardiac silhouette is enlarged.  No acute osseous process seen.   Impression:       Nonobstructive bowel gas pattern.      Electronically signed by: Judah Walter MD  Date: 02/15/2019  Time: 18:47

## 2019-02-17 NOTE — ASSESSMENT & PLAN NOTE
- suspect secondary to PNA  - continue vanc and ceftriaxone as above.  - plan for 7 d IV abx from 1st neg BCX  - will follow

## 2019-02-17 NOTE — PLAN OF CARE
Problem: Adult Inpatient Plan of Care  Goal: Plan of Care Review  Outcome: Ongoing (interventions implemented as appropriate)   02/17/19 9995   Plan of Care Review   Plan of Care Reviewed With patient     Pt C/O pain is managed with PRN analgesic. Family at the bedside. VSS. Able to void freely. Safety maintained. Will monitor.

## 2019-02-17 NOTE — PROGRESS NOTES
Progress Note   Hospital Medicine         Patient Name: Ed Bunch  MRN:  [id number]  [facility] Medicine Team: [facility] MED S Martin Singh MD  Date of Admission:  2/15/2019     Length of Stay:  LOS: 2 days   Expected Discharge Date: 2/19/2019  Principal Problem:  Sepsis due to pneumonia       Subjective:     Interval History/Overnight Events:  Patient states that he is feeling much better today, pleuritic pain is improved, still has some on inspiration from time to time; on RA;   - blood cultures growing strep PNA; stopping Vanc and continuing rocephin 2g IV daily  - blood cultures from 2/16 NGTD; echo shows no vegetations;   -ID consulted and appreciate recs; will need midline and home IV abx likely for 2 weeks;     Review of Systems   Constitutional: Negative for chills, fatigue, fever.   HENT: Negative for sore throat, trouble swallowing.    Eyes: Negative for photophobia, visual disturbance.   Respiratory: Negative for cough, shortness of breath.    Cardiovascular: Negative for chest pain, palpitations, leg swelling.   Gastrointestinal: Negative for abdominal pain, constipation, diarrhea, nausea, vomiting.   Endocrine: Negative for cold intolerance, heat intolerance.   Genitourinary: Negative for dysuria, frequency.   Musculoskeletal: Negative for arthralgias, myalgias.   Skin: Negative for rash, wound, erythema   Neurological: Negative for dizziness, syncope, weakness, light-headedness.   Psychiatric/Behavioral: Negative for confusion, hallucinations, anxiety  All other systems reviewed and are negative.    Objective:     Temp:  [96.5 °F (35.8 °C)-98.9 °F (37.2 °C)]   Pulse:  []   Resp:  [16-20]   BP: (107-133)/(64-80)   SpO2:  [92 %-96 %]       Physical Exam:  Constitutional: appears weak and ill  Head: Normocephalic and atraumatic.   Mouth/Throat: Oropharynx is clear and moist.   Eyes: EOM are normal. Pupils are equal, round, and reactive to light. No scleral icterus.   Neck: Normal range of motion.  Neck supple.   Cardiovascular: Normal rate and regular rhythm.  No murmur heard.  Pulmonary/Chest: Effort normal and breath sounds normal. No respiratory distress. No wheezes, rales, or rhonchi  Abdominal: Soft. Bowel sounds are normal.  No distension or tenderness  Musculoskeletal: Normal range of motion. No edema.   Neurological: Alert and oriented to person, place, and time.   Skin: Skin is warm and dry.   Psychiatric: Normal mood and affect. Behavior is normal.     Recent Labs   Lab 02/15/19  1806 02/16/19  0445 02/17/19  0319   WBC 20.94* 15.93* 10.82   HGB 12.2* 10.9* 11.2*   HCT 35.3* 33.3* 33.1*    256 275     Recent Labs   Lab 02/15/19  1806 02/16/19  0445 02/17/19  0318   * 130* 134*   K 4.3 4.3 4.5   CL 99 102 103   CO2 18* 21* 24   BUN 23 18 13   CREATININE 1.0 0.7 0.7   * 89 94   CALCIUM 10.5 9.8 10.1   MG  --  2.0  --    PHOS  --  2.7  --      Recent Labs   Lab 02/15/19  1806 02/15/19  1840 02/16/19 0445 02/17/19 0318   ALKPHOS 132  --   --   --    ALT 29  --   --   --    AST 25  --   --   --    ALBUMIN 3.3*  --   --   --    PROT 8.2  --   --   --    BILITOT 0.7  --   --   --    INR  --  4.2* 4.2* 2.6*     No results for input(s): POCTGLUCOSE in the last 168 hours.     cefTRIAXone (ROCEPHIN) IVPB  2 g Intravenous Q24H    diltiaZEM  240 mg Oral QHS    furosemide  20 mg Oral Daily    gabapentin  300 mg Oral Daily    lidocaine  1 patch Transdermal Q24H    multivitamin  1 tablet Oral Daily    oseltamivir  75 mg Oral BID    warfarin  5 mg Oral Daily       Assessment and Plan     Mr. Ed Bunch is a 63 y.o. male who presented to [facility] on 2/15/2019 with     Hospital Course:    Mr. Ed Bunch was admitted to Hospital Medicine for management of     Active Hospital Problems    Diagnosis  POA    *Sepsis due to pneumonia [J18.9, A41.9]  Yes    Streptococcal bacteremia [R78.81, B95.5]  Yes    Pneumonia of right middle lobe due to infectious organism [J18.1]  Yes    Acute on  chronic heart failure [I50.9]  Unknown    Atrial fibrillation with RVR [I48.91]  Yes    HTN (hypertension) [I10]  Yes    Current use of long term anticoagulation [Z79.01]  Not Applicable    Diarrhea [R19.7]  Yes    Hyponatremia [E87.1]  Yes      Resolved Hospital Problems   No resolved problems to display.     Sepsis due to Pneumonia of right middle lobe due to infectious organism  Streptococcal Pneumonia bacteremia      - elevated WBC and HR  - growing 2/2 bottles positive for strep bacteremia  - cont Rocephin 2g IV daily; repeat blood cultures from 2/16 NGTD  - ID consulted, appreciate recs;   - empirically treating with tamiflu with recent flu exposure       Acute on chronic heart failure     - No evidence of hypervolemia on exam, but pulmonary edema on CXR +orthopnea  - compensated but would benefit from diuretic. Will hold off on treatment as patient on RA, hyponatremic, and currently receiving tx for CAP  - No echo on file- will check in AM  - continue dilt; hold ACEI, diuretic (lasix PRN) to prevent hypovolemia  - monitor response with daily weights, strict I/Os, oxygen requirements  - Na restricted/fluid restricted diet 1500 ml      A-fib with RVR     Current use of long term anticoagulation  Rate controlled with diltiazem 240 mg PO nightly; AC with warfarin  - supratherapeutic INR of 4.2; will hold warfarin and check INR daily (INR 5 at last visit)  - f/w cardiology through [facility]  - [facility]  - monitor on tele; check lytes      Diarrhea     Unclear etiology but likely AGE  - low suspicion for C. Diff without recent abx or hospitalization  - check stool studies, C. diff  - c/w abx for CAP      Hyponatremia     - likely 2/2 poor PO intake and GI losses  - Given 1L NS in ED.  C/w mIVF hydration- recheck with AM labs but will give slowly with heart failure      HTN (hypertension)     BP controlled  - hold lisinopril to prevent hypotension  - reduced dilt dose from 240 to 120 on admission, but will  continue 240 mg nightly thereafter                 Diet:  caridac  GI PPx:    DVT PPx:    Goals of Care:  full    High Risk Conditions:  Sepsis     Disposition:  Likely Tuesday with picc line and IV abx    Martin Singh MD  Medical Director [facility] Medicine  Spectra:  [phone]  Pager: [phone]

## 2019-02-17 NOTE — PROGRESS NOTES
Progress Note   Hospital Medicine         Patient Name: Ed Bunch  MRN:  [id number]  [facility] Medicine Team: [facility] MED S Martin Singh MD  Date of Admission:  2/15/2019     Length of Stay:  LOS: 1 day   Expected Discharge Date:   Principal Problem:  Sepsis due to pneumonia       Subjective:     Interval History/Overnight Events:  Patient's WBC has improved today, went from 20 to 15, patient is still complaining of right sided pleuritic pain, narcotics adjusted; cont IV abx for now; wife at the bedside   - patient is actually growing 2/2 bottles positive for strept bacteremia, will continue Vanc for now and switching levaquin to rocephin 2g IV daily; will get ID consult in the AM; repeat blood cultures; patient will need to go home on IV abx and picc line;     Review of Systems   Constitutional: Negative for chills, fatigue, fever.   HENT: Negative for sore throat, trouble swallowing.    Eyes: Negative for photophobia, visual disturbance.   Respiratory: Negative for cough, shortness of breath.    Cardiovascular: Negative for chest pain, palpitations, leg swelling.   Gastrointestinal: Negative for abdominal pain, constipation, diarrhea, nausea, vomiting.   Endocrine: Negative for cold intolerance, heat intolerance.   Genitourinary: Negative for dysuria, frequency.   Musculoskeletal: Negative for arthralgias, myalgias.   Skin: Negative for rash, wound, erythema   Neurological: Negative for dizziness, syncope, weakness, light-headedness.   Psychiatric/Behavioral: Negative for confusion, hallucinations, anxiety  All other systems reviewed and are negative.    Objective:     Temp:  [96.3 °F (35.7 °C)-98.8 °F (37.1 °C)]   Pulse:  []   Resp:  [18-20]   BP: (102-154)/(58-96)   SpO2:  [91 %-96 %]       Physical Exam:  Constitutional: appears weak and ill  Head: Normocephalic and atraumatic.   Mouth/Throat: Oropharynx is clear and moist.   Eyes: EOM are normal. Pupils are equal, round, and reactive to light. No  scleral icterus.   Neck: Normal range of motion. Neck supple.   Cardiovascular: Normal rate and regular rhythm.  No murmur heard.  Pulmonary/Chest: Effort normal and breath sounds normal. No respiratory distress. No wheezes, rales, or rhonchi  Abdominal: Soft. Bowel sounds are normal.  No distension or tenderness  Musculoskeletal: Normal range of motion. No edema.   Neurological: Alert and oriented to person, place, and time.   Skin: Skin is warm and dry.   Psychiatric: Normal mood and affect. Behavior is normal.     Recent Labs   Lab 02/15/19  1806 02/16/19  0445   WBC 20.94* 15.93*   HGB 12.2* 10.9*   HCT 35.3* 33.3*    256     Recent Labs   Lab 02/15/19  1806 02/16/19  0445   * 130*   K 4.3 4.3   CL 99 102   CO2 18* 21*   BUN 23 18   CREATININE 1.0 0.7   * 89   CALCIUM 10.5 9.8   MG  --  2.0   PHOS  --  2.7     Recent Labs   Lab 02/15/19  1806 02/15/19  1840 02/16/19  0445   ALKPHOS 132  --   --    ALT 29  --   --    AST 25  --   --    ALBUMIN 3.3*  --   --    PROT 8.2  --   --    BILITOT 0.7  --   --    INR  --  4.2* 4.2*     No results for input(s): POCTGLUCOSE in the last 168 hours.     diltiaZEM  240 mg Oral QHS    furosemide  20 mg Oral Daily    gabapentin  300 mg Oral Daily    levoFLOXacin  750 mg Oral Daily    lidocaine  1 patch Transdermal Q24H    multivitamin  1 tablet Oral Daily    oseltamivir  75 mg Oral BID    vancomycin (VANCOCIN) IVPB  15 mg/kg Intravenous Q12H       Assessment and Plan     Mr. Ed Bunch is a 63 y.o. male who presented to [facility] on 2/15/2019 with     Hospital Course:    Mr. Ed Bunch was admitted to Hospital Medicine for management of     Active Hospital Problems    Diagnosis  POA    *Sepsis due to pneumonia [J18.9, A41.9]  Yes    Streptococcal bacteremia [R78.81, B95.5]  Yes    Pneumonia of right middle lobe due to infectious organism [J18.1]  Yes    Acute on chronic heart failure [I50.9]  Unknown    Atrial fibrillation with RVR [I48.91]   Unknown    HTN (hypertension) [I10]  Yes    Current use of long term anticoagulation [Z79.01]  Not Applicable    Diarrhea [R19.7]  Yes    Hyponatremia [E87.1]  Yes      Resolved Hospital Problems   No resolved problems to display.     Sepsis due to Pneumonia of right middle lobe due to infectious organism  Streptococcal bacteremia      - elevated WBC and HR  - growing 2/2 bottles positive for strep bacteremia  - cont Vanc, switching levaquin to rocephin 2g IV daily; repeating blood cultures  - ID consult for the AM  - empirically treating with tamiflu with recent flu exposure       Acute on chronic heart failure     - No evidence of hypervolemia on exam, but pulmonary edema on CXR +orthopnea  - compensated but would benefit from diuretic. Will hold off on treatment as patient on RA, hyponatremic, and currently receiving tx for CAP  - No echo on file- will check in AM  - continue dilt; hold ACEI, diuretic (lasix PRN) to prevent hypovolemia  - monitor response with daily weights, strict I/Os, oxygen requirements  - Na restricted/fluid restricted diet 1500 ml      A-fib with RVR     Current use of long term anticoagulation  Rate controlled with diltiazem 240 mg PO nightly; AC with warfarin  - supratherapeutic INR of 4.2; will hold warfarin and check INR daily (INR 5 at last visit)  - f/w cardiology through [facility]  - [facility]  - monitor on tele; check lytes      Diarrhea     Unclear etiology but likely AGE  - low suspicion for C. Diff without recent abx or hospitalization  - check stool studies, C. diff  - c/w abx for CAP      Hyponatremia     - likely 2/2 poor PO intake and GI losses  - Given 1L NS in ED.  C/w mIVF hydration- recheck with AM labs but will give slowly with heart failure      HTN (hypertension)     BP controlled  - hold lisinopril to prevent hypotension  - reduced dilt dose from 240 to 120 on admission, but will continue 240 mg nightly thereafter                 Diet:  caridac  GI PPx:    DVT PPx:     Goals of Care:  full    High Risk Conditions:  Sepsis     Disposition:  Possibly Monday or Tuesday with picc line and IV abx    Martin Singh MD  Medical Director [facility] Medicine  Spectra:  [phone]  Pager: [phone]

## 2019-02-17 NOTE — SUBJECTIVE & OBJECTIVE
Past Medical History:   Diagnosis Date    Atrial fibrillation     Hypertension        Past Surgical History:   Procedure Laterality Date    CARDIOVERSION      COLONOSCOPY      TONSILLECTOMY         Review of patient's allergies indicates:  No Known Allergies    Medications:  Medications Prior to Admission   Medication Sig    acetaminophen (TYLENOL) 500 MG tablet Take 500 mg by mouth.    diltiaZEM (TIAZAC) 240 MG Cs24     gabapentin (NEURONTIN) 300 MG capsule TK ONE C PO  D    lisinopril 10 MG tablet TK 1 T PO  QHS    multivitamin capsule Take 1 capsule by mouth.    furosemide (LASIX) 20 MG tablet TK 1 T PO  D    mupirocin (BACTROBAN) 2 % ointment Apply topically 2 (two) times daily. AAA    warfarin (COUMADIN) 7.5 MG tablet      Antibiotics (From admission, onward)    Start     Stop Route Frequency Ordered    02/16/19 1846  cefTRIAXone (ROCEPHIN) 2 g in dextrose 5 % 50 mL IVPB      -- IV Every 24 hours (non-standard times) 02/16/19 1847    02/16/19 1100  vancomycin 1.25 g in 5 % dextrose 250 mL IVPB  (Vancomycin IVPB with levels panel)      -- IV Every 12 hours (non-standard times) 02/15/19 2350        Antifungals (From admission, onward)    None        Antivirals (From admission, onward)        Stop Route Frequency     Tamiflu      02/20 2059 Oral 2 times daily           Immunization History   Administered Date(s) Administered    Td (ADULT) 08/31/2018       Family History     Problem Relation (Age of Onset)    Cancer Father, Sister    No Known Problems Mother        Social History     Socioeconomic History    Marital status:      Spouse name: Not on file    Number of children: Not on file    Years of education: Not on file    Highest education level: Not on file   Social Needs    Financial resource strain: Not on file    Food insecurity - worry: Not on file    Food insecurity - inability: Not on file    Transportation needs - medical: Not on file    Transportation needs - non-medical:  Not on file   Occupational History    Not on file   Tobacco Use    Smoking status: Never Smoker    Smokeless tobacco: Never Used   Substance and Sexual Activity    Alcohol use: Not on file    Drug use: Not on file    Sexual activity: Not on file   Other Topics Concern    Not on file   Social History Narrative    Not on file     Review of Systems   Constitutional: Negative for appetite change, chills, diaphoresis, fatigue, fever and unexpected weight change.   HENT: Negative for congestion, ear pain, sore throat and tinnitus.    Eyes: Negative for pain, redness and visual disturbance.   Respiratory: Negative for cough, shortness of breath and wheezing.    Cardiovascular: Negative for chest pain, palpitations and leg swelling.   Gastrointestinal: Negative for abdominal pain, constipation, diarrhea, rectal pain and vomiting.   Endocrine: Negative for cold intolerance and heat intolerance.   Genitourinary: Negative for dysuria, flank pain, frequency, hematuria and urgency.   Musculoskeletal: Negative for arthralgias, back pain, myalgias and neck pain.   Skin: Negative for rash.   Allergic/Immunologic: Negative for immunocompromised state.   Neurological: Negative for dizziness, light-headedness, numbness and headaches.   Hematological: Negative for adenopathy. Does not bruise/bleed easily.   Psychiatric/Behavioral: Negative for confusion and sleep disturbance. The patient is not nervous/anxious.      Objective:     Vital Signs (Most Recent):  Temp: 96.5 °F (35.8 °C) (02/17/19 0505)  Pulse: 87 (02/17/19 0505)  Resp: 19 (02/17/19 0505)  BP: 133/80 (02/17/19 0505)  SpO2: 95 % (02/17/19 0505) Vital Signs (24h Range):  Temp:  [96.5 °F (35.8 °C)-98.9 °F (37.2 °C)] 96.5 °F (35.8 °C)  Pulse:  [] 87  Resp:  [18-19] 19  SpO2:  [93 %-96 %] 95 %  BP: (102-133)/(58-80) 133/80     Weight: 88.9 kg (196 lb)  Body mass index is 27.34 kg/m².    Estimated Creatinine Clearance: 118 mL/min (based on SCr of 0.7  mg/dL).    Physical Exam   Constitutional: He is oriented to person, place, and time. He appears well-developed and well-nourished. No distress.   HENT:   Head: Normocephalic and atraumatic.   Cardiovascular: Normal rate, regular rhythm and normal heart sounds. Exam reveals no gallop and no friction rub.   No murmur heard.  Pulmonary/Chest: Effort normal and breath sounds normal. No respiratory distress. He has no wheezes. He has no rales.   Abdominal: Soft. Bowel sounds are normal. He exhibits no distension and no mass. There is no tenderness. There is no rebound and no guarding.   Musculoskeletal: He exhibits no edema.   Neurological: He is alert and oriented to person, place, and time.   Skin: Skin is warm and dry. He is not diaphoretic.   Psychiatric: He has a normal mood and affect. His behavior is normal.       Significant Labs:   Blood Culture:   Recent Labs   Lab 02/15/19  1926 02/15/19  1939 02/16/19  1943 02/16/19  1949   LABBLOO Gram stain aer bottle: Gram positive cocci in chains resembling Strep  Gram stain keron bottle: Gram positive cocci in chains resembling Strep  Results called to and read back by: Xavi Borja RN  02/16/2019  10:39 Gram stain aer bottle: Gram positive cocci in chains resembling Strep   Results called to and read back by: Xavi Borja RN  02/16/2019  10:39 No Growth to date No Growth to date     CBC:   Recent Labs   Lab 02/15/19  1806 02/16/19  0445 02/17/19  0319   WBC 20.94* 15.93* 10.82   HGB 12.2* 10.9* 11.2*   HCT 35.3* 33.3* 33.1*    256 275     CMP:   Recent Labs   Lab 02/15/19  1806 02/16/19  0445 02/17/19  0318   * 130* 134*   K 4.3 4.3 4.5   CL 99 102 103   CO2 18* 21* 24   * 89 94   BUN 23 18 13   CREATININE 1.0 0.7 0.7   CALCIUM 10.5 9.8 10.1   PROT 8.2  --   --    ALBUMIN 3.3*  --   --    BILITOT 0.7  --   --    ALKPHOS 132  --   --    AST 25  --   --    ALT 29  --   --    ANIONGAP 11 7* 7*   EGFRNONAA >60.0 >60.0 >60.0     All pertinent labs  within the past 24 hours have been reviewed.    Significant Imaging: I have reviewed all pertinent imaging results/findings within the past 24 hours.   CT Abdomen Pelvis With Contrast [[id number]] Resulted: 02/15/19 2112   Order Status: Completed Updated: 02/15/19 2115   Narrative:     EXAMINATION:  CT ABDOMEN PELVIS WITH CONTRAST    CLINICAL HISTORY:  severe right flank pain, hx of a. fib on coumadin, r/o bleed vs ischemia;    TECHNIQUE:  Low dose axial images, sagittal and coronal reformations were obtained from the lung bases to the pubic symphysis following the IV administration of 100 mL of Omnipaque 350 and the oral administration of 30 mL of Omnipaque 350.    COMPARISON:  None.    FINDINGS:  Heart: Normal in size. No pericardial effusion.    Lung Bases: Trace dependent right pleural fluid with adjacent atelectatic change.  Consolidation in the medial segment right middle lobe.  Bandlike opacities are seen at the right lung base, likely atelectasis or scarring.    Liver: Normal in size and attenuation, with no focal hepatic lesions.    Gallbladder: No calcified gallstones.    Bile Ducts: No evidence of dilated ducts.    Pancreas: No mass or peripancreatic fat stranding.    Spleen: Unremarkable.    Adrenals: Hypoattenuating small nodule in the right adrenal gland consistent with probable myelolipoma.  Subcentimeter left adrenal nodule consistent with probable adenoma.    Kidneys/ Ureters: Normal in size and location. Normal concentration and excretion of contrast. No hydronephrosis or nephrolithiasis. No ureteral dilatation.    Bladder: No evidence of wall thickening.    Reproductive organs: Unremarkable.    GI Tract/Mesentery: Sigmoid diverticulosis without evidence of acute diverticulitis.  No evidence of bowel obstruction or inflammation. Appendix visualized and unremarkable.    Peritoneal Space: No ascites. No free air.    Retroperitoneum:  No significant adenopathy.    Abdominal wall:  Small fat containing  left inguinal hernia.    Vasculature: Mild atherosclerosis without aneurysm.    Bones: Bilateral L5 pars defects with grade 2 spondylolisthesis.  No aggressive osseous lesions.   Impression:       No acute intra-abdominal findings.    Sigmoid diverticulosis without evidence of acute diverticulitis.    Bilateral L5 pars defects with grade 2 spondylolisthesis.    Right adrenal probable small myelolipoma.  Left adrenal probable small adenoma.    Trace dependent right pleural fluid with adjacent atelectatic change.  Consolidation in the medial segment right middle lobe.  Bandlike opacities are seen at the right lung base, likely atelectasis or scarring.    Electronically signed by resident: Rusty Brothers  Date: 02/15/2019  Time: 20:22    Electronically signed by: Yoni Veliz MD  Date: 02/15/2019  Time: 21:12   X-Ray Chest 1 View [[id number]] Resulted: 02/15/19 1852   Order Status: Completed Updated: 02/15/19 1855   Narrative:     EXAMINATION:  XR CHEST 1 VIEW    CLINICAL HISTORY:  chest pain; Chest pain, unspecified    TECHNIQUE:  Single frontal view of the chest was performed.    COMPARISON:  Abdominal radiograph same day    FINDINGS:  Monitoring leads overlie the chest.  Patient is slightly rotated.    Cardiac silhouette appears enlarged with prominence of the central pulmonary vasculature and bilateral diffuse interstitial coarsening with a perihilar and basilar predominance suggesting pulmonary edema/CHF pattern.  Suspected small right and trace left pleural effusions with right basilar subsegmental atelectasis/infiltrate.  Prominent osseous spurring of the right anterior 1st rib.  Calcific atherosclerosis of the thoracic aorta.  Upper mediastinal contours are otherwise within normal limits.  No large pneumothorax.  No acute osseous process seen.   Impression:       Findings suggesting pulmonary edema/CHF pattern with right greater than left small pleural effusions.  Short-term follow-up chest radiography  after therapy is recommended to resolution.      Electronically signed by: Judah Walter MD  Date: 02/15/2019  Time: 18:52   X-Ray Abdomen AP 1 View (KUB) [[id number]] Resulted: 02/15/19 1847   Order Status: Completed Updated: 02/15/19 1850   Narrative:     EXAMINATION:  XR ABDOMEN AP 1 VIEW    CLINICAL HISTORY:  Unspecified abdominal pain    TECHNIQUE:  AP View(s) of the abdomen was performed.    COMPARISON:  None    FINDINGS:  Monitoring leads overlie the chest and abdomen.  Resolution is limited by body habitus with underpenetration.    Nonobstructive bowel gas pattern.  No organomegaly or significant mass effect.  No large amount of free air or abnormal intra-abdominal calcification definitively seen.  Suspected small bilateral pleural effusions, right greater than left as well as right basilar subsegmental scarring versus atelectasis.  Cardiac silhouette is enlarged.  No acute osseous process seen.   Impression:       Nonobstructive bowel gas pattern.      Electronically signed by: Judah Walter MD  Date: 02/15/2019  Time: 18:47

## 2019-02-17 NOTE — ASSESSMENT & PLAN NOTE
- suspect RML PNA with Strep Pneumo  - on and continue Vanc and Ceftriaxone  - WIll follow cultures and if Strep is not resistant to ceftriaxone then can continue on that.  - clinically improved and non septic

## 2019-02-17 NOTE — CONSULTS
[facility]  Infectious Disease  Consult Note    Patient Name: Ed Bunch  MRN: [id number]  Admission Date: 2/15/2019  Hospital Length of Stay: 2 days  Attending Physician: Martin Singh MD  Primary Care Provider: Primary Doctor No     Isolation Status: Droplet    Patient information was obtained from patient and family.      Consults  Assessment/Plan:     Streptococcal bacteremia    - suspect secondary to PNA  - continue vanc and ceftriaxone as above.  - plan for 7 d IV abx from 1st neg BCX  - will follow     Pneumonia of right middle lobe due to infectious organism    - suspect RML PNA with Strep Pneumo  - on and continue Vanc and Ceftriaxone  - WIll follow cultures and if Strep is not resistant to ceftriaxone then can continue on that.  - clinically improved and non septic         Thank you for your consult. I will follow-up with patient. Please contact us if you have any additional questions.    NURY Powell  Infectious Disease  [facility]    Subjective:     Principal Problem: Sepsis due to pneumonia    HPI: Ed Bunch is a 63 y.o. year old male with co-morbidities including chronic afib (on coumadin), CHF, and HTN who presents to the ED complaining of  intermittent,worsening, non-radiating right flank pain this afternoon. Pain is worse with palpation, heavy breathing and described as sharp. Pt reports prior similar episode 2 days ago, which resolved after approximately 4 hours. He states that symptoms were still present at this time, but relatively more mild. Wife at bedside states he was dx with the flu 1 week ago. Pt reports f/c/night sweats, OTERO, orthopnea, productive yellow cough x7 days, and diarrhea for 5 days.  Patient reports daughter recently flu positive and found flu positive yesterday at [facility].  Pt denies any dysuria or radiating grown pain but does report hematuria yesterday (resolved).  No hx of kidney stones. Pt states he has attempted to manage  "pain with tylenol with no relief. Pt reports weight loss and reduced PO intake over the last week.  No recent abx use or hospitalization.     In ED, tachypneic to 20s, 91-99% on RA. CBC shows WBC 20.94k, LA 0.9.  UA negative for UTI. INR 4.2 (aPTT 45.8). Na 128- given 1L NS IVF.  CO2 18, renal fxn baseline. CXR shows CM + pulmonary edema as well as "Suspected small right and trace left pleural effusions with right basilar subsegmental atelectasis/infiltrate."  CT A/P with PO/IV contrast shows no acute intraabdominal concerns.  Imaging does show "Trace dependent right pleural fluid with adjacent atelectatic change.  Consolidation in the medial segment right middle lobe.  Bandlike opacities are seen at the right lung base, likely atelectasis or scarring."    Patient was admitted and found to have Strep Pneumoniae bacteremia and RLL PNA.  He has been on Vanc and ceftriaxone.  Repeat BCxs are NGTD.  Constitutional symptoms have resolved and his pleuritic pain is dramatically better.  Influenza testing is negative and WBC is normalized.    Past Medical History:   Diagnosis Date    Atrial fibrillation     Hypertension        Past Surgical History:   Procedure Laterality Date    CARDIOVERSION      COLONOSCOPY      TONSILLECTOMY         Review of patient's allergies indicates:  No Known Allergies    Medications:  Medications Prior to Admission   Medication Sig    acetaminophen (TYLENOL) 500 MG tablet Take 500 mg by mouth.    diltiaZEM (TIAZAC) 240 MG Cs24     gabapentin (NEURONTIN) 300 MG capsule TK ONE C PO  D    lisinopril 10 MG tablet TK 1 T PO  QHS    multivitamin capsule Take 1 capsule by mouth.    furosemide (LASIX) 20 MG tablet TK 1 T PO  D    mupirocin (BACTROBAN) 2 % ointment Apply topically 2 (two) times daily. AAA    warfarin (COUMADIN) 7.5 MG tablet      Antibiotics (From admission, onward)    Start     Stop Route Frequency Ordered    02/16/19 1846  cefTRIAXone (ROCEPHIN) 2 g in dextrose 5 % 50 mL " IVPB      -- IV Every 24 hours (non-standard times) 02/16/19 1847        Antifungals (From admission, onward)    None        Antivirals (From admission, onward)        Stop Route Frequency     Tamiflu      02/20 2059 Oral 2 times daily           Immunization History   Administered Date(s) Administered    Td (ADULT) 08/31/2018       Family History     Problem Relation (Age of Onset)    Cancer Father, Sister    No Known Problems Mother        Social History     Socioeconomic History    Marital status:      Spouse name: Not on file    Number of children: Not on file    Years of education: Not on file    Highest education level: Not on file   Social Needs    Financial resource strain: Not on file    Food insecurity - worry: Not on file    Food insecurity - inability: Not on file    Transportation needs - medical: Not on file    Transportation needs - non-medical: Not on file   Occupational History    Not on file   Tobacco Use    Smoking status: Never Smoker    Smokeless tobacco: Never Used   Substance and Sexual Activity    Alcohol use: Not on file    Drug use: Not on file    Sexual activity: Not on file   Other Topics Concern    Not on file   Social History Narrative    Not on file     Review of Systems   Constitutional: Negative for appetite change, chills, diaphoresis, fatigue, fever and unexpected weight change.   HENT: Negative for congestion, ear pain, sore throat and tinnitus.    Eyes: Negative for pain, redness and visual disturbance.   Respiratory: Positive for cough. Negative for shortness of breath and wheezing.    Cardiovascular: Positive for chest pain. Negative for palpitations and leg swelling.   Gastrointestinal: Negative for abdominal pain, constipation, diarrhea, rectal pain and vomiting.   Endocrine: Negative for cold intolerance and heat intolerance.   Genitourinary: Negative for dysuria, flank pain, frequency, hematuria and urgency.   Musculoskeletal: Negative for  arthralgias, back pain, myalgias and neck pain.   Skin: Negative for rash.   Allergic/Immunologic: Negative for immunocompromised state.   Neurological: Negative for dizziness, light-headedness, numbness and headaches.   Hematological: Negative for adenopathy. Does not bruise/bleed easily.   Psychiatric/Behavioral: Negative for confusion and sleep disturbance. The patient is not nervous/anxious.      Objective:     Vital Signs (Most Recent):  Temp: 98.5 °F (36.9 °C) (02/17/19 1610)  Pulse: (!) 113 (02/17/19 1610)  Resp: 18 (02/17/19 1610)  BP: 125/66 (02/17/19 1610)  SpO2: 96 % (02/17/19 1610) Vital Signs (24h Range):  Temp:  [96.5 °F (35.8 °C)-98.9 °F (37.2 °C)] 98.5 °F (36.9 °C)  Pulse:  [] 113  Resp:  [16-20] 18  SpO2:  [92 %-96 %] 96 %  BP: (107-133)/(66-80) 125/66     Weight: 89 kg (196 lb 3.4 oz)  Body mass index is 27.37 kg/m².    Estimated Creatinine Clearance: 118 mL/min (based on SCr of 0.7 mg/dL).    Physical Exam   Constitutional: He is oriented to person, place, and time. He appears well-developed and well-nourished. No distress.   HENT:   Head: Normocephalic and atraumatic.   Cardiovascular: Normal rate, regular rhythm and normal heart sounds. Exam reveals no gallop and no friction rub.   No murmur heard.  Pulmonary/Chest: Effort normal. No respiratory distress. He has decreased breath sounds in the right middle field and the right lower field. He has no wheezes. He has no rhonchi. He has rales in the right middle field and the right lower field.   Abdominal: Soft. Bowel sounds are normal. He exhibits no distension and no mass. There is no tenderness. There is no rebound and no guarding.   Musculoskeletal: He exhibits no edema.   Neurological: He is alert and oriented to person, place, and time.   Skin: Skin is warm and dry. He is not diaphoretic.   Psychiatric: He has a normal mood and affect. His behavior is normal.       Significant Labs:   Blood Culture:   Recent Labs   Lab 02/15/19  1926  02/15/19  1939 02/16/19 1943 02/16/19 1949   LABBLOO Gram stain aer bottle: Gram positive cocci in chains resembling Strep  Gram stain keron bottle: Gram positive cocci in chains resembling Strep  Results called to and read back by: Xavi Borja RN  02/16/2019  10:39  STREPTOCOCCUS PNEUMONIAE  Susceptibility pending   Gram stain aer bottle: Gram positive cocci in chains resembling Strep   Results called to and read back by: Xavi Borja RN  02/16/2019  10:39  STREPTOCOCCUS PNEUMONIAE  For susceptibility see order #[id number]   No Growth to date No Growth to date     CBC:   Recent Labs   Lab 02/15/19  1806 02/16/19  0445 02/17/19  0319   WBC 20.94* 15.93* 10.82   HGB 12.2* 10.9* 11.2*   HCT 35.3* 33.3* 33.1*    256 275     CMP:   Recent Labs   Lab 02/15/19  1806 02/16/19  0445 02/17/19  0318   * 130* 134*   K 4.3 4.3 4.5   CL 99 102 103   CO2 18* 21* 24   * 89 94   BUN 23 18 13   CREATININE 1.0 0.7 0.7   CALCIUM 10.5 9.8 10.1   PROT 8.2  --   --    ALBUMIN 3.3*  --   --    BILITOT 0.7  --   --    ALKPHOS 132  --   --    AST 25  --   --    ALT 29  --   --    ANIONGAP 11 7* 7*   EGFRNONAA >60.0 >60.0 >60.0     All pertinent labs within the past 24 hours have been reviewed.    Significant Imaging: I have reviewed all pertinent imaging results/findings within the past 24 hours.   Procedure Component Value Units Date/Time   CT Abdomen Pelvis With Contrast [[id number]] Resulted: 02/15/19 2112   Order Status: Completed Updated: 02/15/19 2115   Narrative:     EXAMINATION:  CT ABDOMEN PELVIS WITH CONTRAST    CLINICAL HISTORY:  severe right flank pain, hx of a. fib on coumadin, r/o bleed vs ischemia;    TECHNIQUE:  Low dose axial images, sagittal and coronal reformations were obtained from the lung bases to the pubic symphysis following the IV administration of 100 mL of Omnipaque 350 and the oral administration of 30 mL of Omnipaque 350.    COMPARISON:  None.    FINDINGS:  Heart: Normal in size. No  pericardial effusion.    Lung Bases: Trace dependent right pleural fluid with adjacent atelectatic change.  Consolidation in the medial segment right middle lobe.  Bandlike opacities are seen at the right lung base, likely atelectasis or scarring.    Liver: Normal in size and attenuation, with no focal hepatic lesions.    Gallbladder: No calcified gallstones.    Bile Ducts: No evidence of dilated ducts.    Pancreas: No mass or peripancreatic fat stranding.    Spleen: Unremarkable.    Adrenals: Hypoattenuating small nodule in the right adrenal gland consistent with probable myelolipoma.  Subcentimeter left adrenal nodule consistent with probable adenoma.    Kidneys/ Ureters: Normal in size and location. Normal concentration and excretion of contrast. No hydronephrosis or nephrolithiasis. No ureteral dilatation.    Bladder: No evidence of wall thickening.    Reproductive organs: Unremarkable.    GI Tract/Mesentery: Sigmoid diverticulosis without evidence of acute diverticulitis.  No evidence of bowel obstruction or inflammation. Appendix visualized and unremarkable.    Peritoneal Space: No ascites. No free air.    Retroperitoneum:  No significant adenopathy.    Abdominal wall:  Small fat containing left inguinal hernia.    Vasculature: Mild atherosclerosis without aneurysm.    Bones: Bilateral L5 pars defects with grade 2 spondylolisthesis.  No aggressive osseous lesions.   Impression:       No acute intra-abdominal findings.    Sigmoid diverticulosis without evidence of acute diverticulitis.    Bilateral L5 pars defects with grade 2 spondylolisthesis.    Right adrenal probable small myelolipoma.  Left adrenal probable small adenoma.    Trace dependent right pleural fluid with adjacent atelectatic change.  Consolidation in the medial segment right middle lobe.  Bandlike opacities are seen at the right lung base, likely atelectasis or scarring.    Electronically signed by resident: Rusty Brothers  Date:  02/15/2019  Time: 20:22    Electronically signed by: Yoin Veliz MD  Date: 02/15/2019  Time: 21:12   X-Ray Chest 1 View [[id number]] Resulted: 02/15/19 1852   Order Status: Completed Updated: 02/15/19 1855   Narrative:     EXAMINATION:  XR CHEST 1 VIEW    CLINICAL HISTORY:  chest pain; Chest pain, unspecified    TECHNIQUE:  Single frontal view of the chest was performed.    COMPARISON:  Abdominal radiograph same day    FINDINGS:  Monitoring leads overlie the chest.  Patient is slightly rotated.    Cardiac silhouette appears enlarged with prominence of the central pulmonary vasculature and bilateral diffuse interstitial coarsening with a perihilar and basilar predominance suggesting pulmonary edema/CHF pattern.  Suspected small right and trace left pleural effusions with right basilar subsegmental atelectasis/infiltrate.  Prominent osseous spurring of the right anterior 1st rib.  Calcific atherosclerosis of the thoracic aorta.  Upper mediastinal contours are otherwise within normal limits.  No large pneumothorax.  No acute osseous process seen.   Impression:       Findings suggesting pulmonary edema/CHF pattern with right greater than left small pleural effusions.  Short-term follow-up chest radiography after therapy is recommended to resolution.      Electronically signed by: Judah Walter MD  Date: 02/15/2019  Time: 18:52   X-Ray Abdomen AP 1 View (KUB) [[id number]] Resulted: 02/15/19 1847   Order Status: Completed Updated: 02/15/19 1850   Narrative:     EXAMINATION:  XR ABDOMEN AP 1 VIEW    CLINICAL HISTORY:  Unspecified abdominal pain    TECHNIQUE:  AP View(s) of the abdomen was performed.    COMPARISON:  None    FINDINGS:  Monitoring leads overlie the chest and abdomen.  Resolution is limited by body habitus with underpenetration.    Nonobstructive bowel gas pattern.  No organomegaly or significant mass effect.  No large amount of free air or abnormal intra-abdominal calcification definitively seen.  Suspected  small bilateral pleural effusions, right greater than left as well as right basilar subsegmental scarring versus atelectasis.  Cardiac silhouette is enlarged.  No acute osseous process seen.   Impression:       Nonobstructive bowel gas pattern.      Electronically signed by: Judah Walter MD  Date: 02/15/2019  Time: 18:47

## 2019-02-18 LAB
ANION GAP SERPL CALC-SCNC: 7 MMOL/L
BACTERIA BLD CULT: NORMAL
BASOPHILS # BLD AUTO: 0.05 K/UL
BASOPHILS NFR BLD: 0.4 %
BUN SERPL-MCNC: 13 MG/DL
CALCIUM SERPL-MCNC: 10 MG/DL
CHLORIDE SERPL-SCNC: 102 MMOL/L
CO2 SERPL-SCNC: 25 MMOL/L
CREAT SERPL-MCNC: 0.7 MG/DL
DIFFERENTIAL METHOD: ABNORMAL
EOSINOPHIL # BLD AUTO: 0.1 K/UL
EOSINOPHIL NFR BLD: 0.9 %
ERYTHROCYTE [DISTWIDTH] IN BLOOD BY AUTOMATED COUNT: 12.2 %
EST. GFR  (AFRICAN AMERICAN): >60 ML/MIN/1.73 M^2
EST. GFR  (NON AFRICAN AMERICAN): >60 ML/MIN/1.73 M^2
GLUCOSE SERPL-MCNC: 89 MG/DL
HCT VFR BLD AUTO: 33.6 %
HGB BLD-MCNC: 11 G/DL
IMM GRANULOCYTES # BLD AUTO: 0.17 K/UL
IMM GRANULOCYTES NFR BLD AUTO: 1.5 %
INR PPP: 1.6
LYMPHOCYTES # BLD AUTO: 2.5 K/UL
LYMPHOCYTES NFR BLD: 21.8 %
MCH RBC QN AUTO: 35.5 PG
MCHC RBC AUTO-ENTMCNC: 32.7 G/DL
MCV RBC AUTO: 108 FL
MONOCYTES # BLD AUTO: 1.7 K/UL
MONOCYTES NFR BLD: 14.3 %
NEUTROPHILS # BLD AUTO: 7.1 K/UL
NEUTROPHILS NFR BLD: 61.1 %
NRBC BLD-RTO: 0 /100 WBC
PLATELET # BLD AUTO: 313 K/UL
PMV BLD AUTO: 8.9 FL
POTASSIUM SERPL-SCNC: 4.5 MMOL/L
PROTHROMBIN TIME: 15.6 SEC
RBC # BLD AUTO: 3.1 M/UL
SODIUM SERPL-SCNC: 134 MMOL/L
WBC # BLD AUTO: 11.58 K/UL

## 2019-02-18 PROCEDURE — 99233 PR SUBSEQUENT HOSPITAL CARE,LEVL III: ICD-10-PCS | Mod: ,,, | Performed by: INTERNAL MEDICINE

## 2019-02-18 PROCEDURE — 99233 SBSQ HOSP IP/OBS HIGH 50: CPT | Mod: ,,, | Performed by: HOSPITALIST

## 2019-02-18 PROCEDURE — 80048 BASIC METABOLIC PNL TOTAL CA: CPT

## 2019-02-18 PROCEDURE — 63600175 PHARM REV CODE 636 W HCPCS: Performed by: HOSPITALIST

## 2019-02-18 PROCEDURE — 99233 SBSQ HOSP IP/OBS HIGH 50: CPT | Mod: ,,, | Performed by: INTERNAL MEDICINE

## 2019-02-18 PROCEDURE — 25000003 PHARM REV CODE 250: Performed by: PHYSICIAN ASSISTANT

## 2019-02-18 PROCEDURE — 25000003 PHARM REV CODE 250: Performed by: EMERGENCY MEDICINE

## 2019-02-18 PROCEDURE — 25000003 PHARM REV CODE 250: Performed by: HOSPITALIST

## 2019-02-18 PROCEDURE — 99233 PR SUBSEQUENT HOSPITAL CARE,LEVL III: ICD-10-PCS | Mod: ,,, | Performed by: HOSPITALIST

## 2019-02-18 PROCEDURE — 85610 PROTHROMBIN TIME: CPT

## 2019-02-18 PROCEDURE — 11000001 HC ACUTE MED/SURG PRIVATE ROOM

## 2019-02-18 PROCEDURE — 36415 COLL VENOUS BLD VENIPUNCTURE: CPT

## 2019-02-18 PROCEDURE — 85025 COMPLETE CBC W/AUTO DIFF WBC: CPT

## 2019-02-18 RX ORDER — LEVOFLOXACIN 750 MG/1
750 TABLET ORAL DAILY
Status: DISCONTINUED | OUTPATIENT
Start: 2019-02-18 | End: 2019-02-19 | Stop reason: HOSPADM

## 2019-02-18 RX ORDER — OXYCODONE HYDROCHLORIDE 10 MG/1
10 TABLET ORAL EVERY 4 HOURS PRN
Status: DISCONTINUED | OUTPATIENT
Start: 2019-02-18 | End: 2019-02-19 | Stop reason: HOSPADM

## 2019-02-18 RX ADMIN — HYDROMORPHONE HYDROCHLORIDE 1 MG: 1 INJECTION, SOLUTION INTRAMUSCULAR; INTRAVENOUS; SUBCUTANEOUS at 04:02

## 2019-02-18 RX ADMIN — DILTIAZEM HYDROCHLORIDE 300 MG: 180 CAPSULE, COATED, EXTENDED RELEASE ORAL at 08:02

## 2019-02-18 RX ADMIN — THERA TABS 1 TABLET: TAB at 08:02

## 2019-02-18 RX ADMIN — OXYCODONE HYDROCHLORIDE 10 MG: 10 TABLET ORAL at 08:02

## 2019-02-18 RX ADMIN — OXYCODONE HYDROCHLORIDE 10 MG: 10 TABLET ORAL at 12:02

## 2019-02-18 RX ADMIN — LIDOCAINE 1 PATCH: 50 PATCH TOPICAL at 12:02

## 2019-02-18 RX ADMIN — HYDROMORPHONE HYDROCHLORIDE 1 MG: 1 INJECTION, SOLUTION INTRAMUSCULAR; INTRAVENOUS; SUBCUTANEOUS at 12:02

## 2019-02-18 RX ADMIN — WARFARIN SODIUM 5 MG: 5 TABLET ORAL at 04:02

## 2019-02-18 RX ADMIN — GABAPENTIN 300 MG: 300 CAPSULE ORAL at 08:02

## 2019-02-18 RX ADMIN — LEVOFLOXACIN 750 MG: 750 TABLET, FILM COATED ORAL at 05:02

## 2019-02-18 RX ADMIN — OXYCODONE HYDROCHLORIDE 10 MG: 10 TABLET ORAL at 04:02

## 2019-02-18 RX ADMIN — VANCOMYCIN HYDROCHLORIDE 1750 MG: 100 INJECTION, POWDER, LYOPHILIZED, FOR SOLUTION INTRAVENOUS at 03:02

## 2019-02-18 RX ADMIN — HYDROMORPHONE HYDROCHLORIDE 1 MG: 1 INJECTION, SOLUTION INTRAMUSCULAR; INTRAVENOUS; SUBCUTANEOUS at 08:02

## 2019-02-18 RX ADMIN — OSELTAMIVIR PHOSPHATE 75 MG: 75 CAPSULE ORAL at 08:02

## 2019-02-18 NOTE — ASSESSMENT & PLAN NOTE
- suspect RML PNA with Strep Pneumo  - on Vanc and Ceftriaxone  - discharge on levofloxacin 750 mg po daily to complete 14 days (2/16-3/2/19)   - Please take  Levofloxacin 2 hours before or after mineral supplements, oral multivitamins, dairy products, or calcium-fortified juices due to food-drug interactions.  - will sign off. Please call if with questions   - afebrile, stable, feels much improved

## 2019-02-18 NOTE — PROGRESS NOTES
Progress Note   Hospital Medicine         Patient Name: Ed Bunch  MRN:  [id number]  [facility] Medicine Team: [facility] Martin Singh MD  Date of Admission:  2/15/2019     Length of Stay:  LOS: 3 days   Expected Discharge Date: 2/19/2019  Principal Problem:  Sepsis due to pneumonia       Subjective:     Interval History/Overnight Events:  patient is doing well today, pain is improving and will transition off of IV narcotics to PO, will likely be able to go home tomorrow with PO levaquin and not need abx     Review of Systems   Constitutional: Negative for chills, fatigue, fever.   HENT: Negative for sore throat, trouble swallowing.    Eyes: Negative for photophobia, visual disturbance.   Respiratory: Negative for cough, shortness of breath.    Cardiovascular: Negative for chest pain, palpitations, leg swelling.   Gastrointestinal: Negative for abdominal pain, constipation, diarrhea, nausea, vomiting.   Endocrine: Negative for cold intolerance, heat intolerance.   Genitourinary: Negative for dysuria, frequency.   Musculoskeletal: Negative for arthralgias, myalgias.   Skin: Negative for rash, wound, erythema   Neurological: Negative for dizziness, syncope, weakness, light-headedness.   Psychiatric/Behavioral: Negative for confusion, hallucinations, anxiety  All other systems reviewed and are negative.    Objective:     Temp:  [96.4 °F (35.8 °C)-99.2 °F (37.3 °C)]   Pulse:  [57-97]   Resp:  [16-18]   BP: (120-144)/(68-79)   SpO2:  [95 %-98 %]       Physical Exam:  Constitutional: appears weak and ill  Head: Normocephalic and atraumatic.   Mouth/Throat: Oropharynx is clear and moist.   Eyes: EOM are normal. Pupils are equal, round, and reactive to light. No scleral icterus.   Neck: Normal range of motion. Neck supple.   Cardiovascular: Normal rate and regular rhythm.  No murmur heard.  Pulmonary/Chest: Effort normal and breath sounds normal. No respiratory distress. No wheezes, rales, or rhonchi  Abdominal: Soft.  Bowel sounds are normal.  No distension or tenderness  Musculoskeletal: Normal range of motion. No edema.   Neurological: Alert and oriented to person, place, and time.   Skin: Skin is warm and dry.   Psychiatric: Normal mood and affect. Behavior is normal.     Recent Labs   Lab 02/15/19  1806 02/16/19  0445 02/17/19  0319 02/18/19  0341   WBC 20.94* 15.93* 10.82 11.58   HGB 12.2* 10.9* 11.2* 11.0*   HCT 35.3* 33.3* 33.1* 33.6*    256 275 313     Recent Labs   Lab 02/16/19 0445 02/17/19 0318 02/18/19  0341   * 134* 134*   K 4.3 4.5 4.5    103 102   CO2 21* 24 25   BUN 18 13 13   CREATININE 0.7 0.7 0.7   GLU 89 94 89   CALCIUM 9.8 10.1 10.0   MG 2.0  --   --    PHOS 2.7  --   --      Recent Labs   Lab 02/15/19  1806  02/16/19  0445 02/17/19  0318 02/18/19  0341   ALKPHOS 132  --   --   --   --    ALT 29  --   --   --   --    AST 25  --   --   --   --    ALBUMIN 3.3*  --   --   --   --    PROT 8.2  --   --   --   --    BILITOT 0.7  --   --   --   --    INR  --    < > 4.2* 2.6* 1.6*    < > = values in this interval not displayed.     No results for input(s): POCTGLUCOSE in the last 168 hours.     cefTRIAXone (ROCEPHIN) IVPB  2 g Intravenous Q24H    diltiaZEM  300 mg Oral QHS    furosemide  20 mg Oral Daily    gabapentin  300 mg Oral Daily    lidocaine  1 patch Transdermal Q24H    multivitamin  1 tablet Oral Daily    warfarin  5 mg Oral Daily       Assessment and Plan     Mr. Ed Bunch is a 63 y.o. male who presented to [facility] on 2/15/2019 with     Hospital Course:    Mr. Ed Bunch was admitted to Hospital Medicine for management of     Active Hospital Problems    Diagnosis  POA    *Sepsis due to pneumonia [J18.9, A41.9]  Yes    Streptococcal bacteremia [R78.81, B95.5]  Yes    Pneumonia of right middle lobe due to infectious organism [J18.1]  Yes    Acute on chronic heart failure [I50.9]  Unknown    Atrial fibrillation with RVR [I48.91]  Yes    HTN (hypertension) [I10]  Yes     Current use of long term anticoagulation [Z79.01]  Not Applicable    Diarrhea [R19.7]  Yes    Hyponatremia [E87.1]  Yes      Resolved Hospital Problems   No resolved problems to display.     Sepsis due to Pneumonia of right middle lobe due to infectious organism  Streptococcal Pneumonia bacteremia      - elevated WBC and HR  - growing 2/2 bottles positive for strep bacteremia  - on Rocephin 2g IV daily; repeat blood cultures from 2/16 NGTD  - ID consulted, appreciate recs;   - empirically treating with tamiflu with recent flu exposure    - likely be able to go home on levaquin for a total of 14 days therapy  Stop date: 3/2/19   Acute on chronic heart failure     - No evidence of hypervolemia on exam, but pulmonary edema on CXR +orthopnea  - compensated but would benefit from diuretic. Will hold off on treatment as patient on RA, hyponatremic, and currently receiving tx for CAP  - No echo on file- will check in AM  - continue dilt; hold ACEI, diuretic (lasix PRN) to prevent hypovolemia  - monitor response with daily weights, strict I/Os, oxygen requirements  - Na restricted/fluid restricted diet 1500 ml      A-fib with RVR     Current use of long term anticoagulation  Rate controlled with diltiazem 240 mg PO nightly; AC with warfarin  - supratherapeutic INR of 4.2; will hold warfarin and check INR daily (INR 5 at last visit)  - f/w cardiology through [facility]  - [facility] 2  - monitor on tele; check lytes      Diarrhea     Unclear etiology but likely AGE  - low suspicion for C. Diff without recent abx or hospitalization  - check stool studies, C. diff  - c/w abx for CAP      Hyponatremia     - likely 2/2 poor PO intake and GI losses  - Given 1L NS in ED.  C/w mIVF hydration- recheck with AM labs but will give slowly with heart failure      HTN (hypertension)     BP controlled  - hold lisinopril to prevent hypotension  - reduced dilt dose from 240 to 120 on admission, but will continue 240 mg nightly thereafter                  Diet:  caridac  GI PPx:    DVT PPx:    Goals of Care:  full    High Risk Conditions:  Sepsis     Disposition:  Likely tomorrow with no PICC line    Martin Singh MD  Medical Director [facility] Medicine  Spectra:  [phone]  Pager: [phone]

## 2019-02-18 NOTE — PLAN OF CARE
[facility] [employer] [facility] [address] [facility] [address] [facility] [address]  [address]  [facility] [address]  Phone: [phone] Fax: [phone]    Payor: MEDICAID / [facility] / Product Type: Managed Medicaid /        02/18/19 1317   Discharge Assessment   Assessment Type Discharge Planning Assessment   Confirmed/corrected address and phone number on facesheet? Yes   Assessment information obtained from? Patient   Expected Length of Stay (days) 4   Communicated expected length of stay with patient/caregiver yes   Prior to hospitilization cognitive status: Alert/Oriented   Prior to hospitalization functional status: Independent   Current cognitive status: Alert/Oriented   Current Functional Status: Independent   Lives With spouse   Able to Return to Prior Arrangements yes   Is patient able to care for self after discharge? Yes   Who are your caregiver(s) and their phone number(s)? (Rola Bunch  spouse [phone])   Patient's perception of discharge disposition (patient will need IV antibiotics at discharge/ PICC line to be placed tomorrow   )   Patient currently receives any other outside agency services? No   Equipment Currently Used at Home none   Do you have any problems affording any of your prescribed medications? TBD   Does the patient have transportation home? Yes   Transportation Anticipated family or friend will provide   Discharge Plan A Home with family  (Infusion company )   Discharge Plan B Home with family;Home Health  (Infusion Company )   Patient/Family in Agreement with Plan yes

## 2019-02-18 NOTE — PLAN OF CARE
Problem: Fall Injury Risk  Goal: Absence of Fall and Fall-Related Injury    Intervention: Identify and Manage Contributors to Fall Injury Risk   02/18/19 1535   Manage Acute Allergic Reaction   Medication Review/Management medications reviewed;high risk medications identified   Identify and Manage Contributors to Fall Injury Risk   Self-Care Promotion independence encouraged;BADL personal objects within reach         Problem: Adult Inpatient Plan of Care  Goal: Plan of Care Review  Outcome: Ongoing (interventions implemented as appropriate)   02/18/19 1535   Plan of Care Review   Plan of Care Reviewed With patient;spouse   Progress improving       Problem: Infection  Goal: Infection Symptom Resolution    Intervention: Prevent or Manage Infection   02/18/19 1535   Prevent or Manage Infection   Fever Reduction/Comfort Measures medication administered   Infection Management aseptic technique maintained

## 2019-02-18 NOTE — PROGRESS NOTES
PHARMACY CONSULT NOTE: FARZANEH Bunch is a 63 y.o. male on warfarin therapy for Atrial Fibrillation. PharmD has been consulted for warfarin dosing.    Current order: 5 mg PO daily  Home dose: 11.25 on MWF and 7.5 mg PO all other days   Coumadin clinic enrollment: Patient follows with cardiology at [facility]  INR goal: 2-3    Lab Results   Component Value Date    INR 1.6 (H) 02/18/2019    INR 2.6 (H) 02/17/2019    INR 4.2 (H) 02/16/2019     Significant drug interactions:  Diet: Adult Cardiac     Recommendation(s):    INR today 1.6 after 5 mg x 1 dose last night, INR above goal on admission   Hemoglobin stable, would continue current 5 mg PO daily tonight   Pharmacy will continue to follow and monitor warfarin    Thank you for the consult,  Sridevi Perez, PharmD  PGY-2 Internal Medicine Pharmacy Resident  [phone]    **Note: Consults are reviewed Monday-Friday 7:00am-3:30pm. The above recommendations are only suggested. The recommendations should be considered in conjunction with all patient factors.**

## 2019-02-18 NOTE — PLAN OF CARE
Problem: Adult Inpatient Plan of Care  Goal: Plan of Care Review  Outcome: Ongoing (interventions implemented as appropriate)   02/18/19 4017   Plan of Care Review   Plan of Care Reviewed With patient     Pt remain free from fall and injuries. Pain is managed with PRN Hydromorphone. Wife remain at the bedside. Tolerated meds well. VSS. Safety maintained, Will monitor.

## 2019-02-18 NOTE — PLAN OF CARE
Problem: Adult Inpatient Plan of Care  Goal: Optimal Comfort and Wellbeing    Intervention: Provide Person-Centered Care   02/18/19 5702   Support Dyspnea Relief   Trust Relationship/Rapport care explained;choices provided;questions answered

## 2019-02-18 NOTE — PROGRESS NOTES
[facility]  Infectious Disease  Progress Note    Patient Name: Ed Bunch  MRN: [id number]  Admission Date: 2/15/2019  Length of Stay: 3 days  Attending Physician: Martin Signh MD  Primary Care Provider: Primary Doctor No    Isolation Status: No active isolations  Assessment/Plan:      Pneumonia of right middle lobe due to infectious organism    - suspect RML PNA with Strep Pneumo  - on Vanc and Ceftriaxone  - discharge on levofloxacin 750 mg po daily to complete 14 days (2/16-3/2/19)   - Please take  Levofloxacin 2 hours before or after mineral supplements, oral multivitamins, dairy products, or calcium-fortified juices due to food-drug interactions.  - will need to notify coumadin clinic as can this can interact with coumadin- discussed with pt  - will sign off. Please call if with questions   - afebrile, stable, feels much improved         Thank you for your consult. I will sign off. Please contact us if you have any additional questions.    [place] PA-C  Infectious Disease  [facility]  [phone]    Subjective:     Principal Problem:Sepsis due to pneumonia    HPI: Ed Bunch is a 63 y.o. year old male with co-morbidities including chronic afib (on coumadin), CHF, and HTN who presents to the ED complaining of  intermittent,worsening, non-radiating right flank pain this afternoon. Pain is worse with palpation, heavy breathing and described as sharp. Pt reports prior similar episode 2 days ago, which resolved after approximately 4 hours. He states that symptoms were still present at this time, but relatively more mild. Wife at bedside states he was dx with the flu 1 week ago. Pt reports f/c/night sweats, OTERO, orthopnea, productive yellow cough x7 days, and diarrhea for 5 days.  Patient reports daughter recently flu positive and found flu positive yesterday at [facility].  Pt denies any dysuria or radiating grown pain but does report hematuria yesterday (resolved).  No hx  "of kidney stones. Pt states he has attempted to manage pain with tylenol with no relief. Pt reports weight loss and reduced PO intake over the last week.  No recent abx use or hospitalization.     In ED, tachypneic to 20s, 91-99% on RA. CBC shows WBC 20.94k, LA 0.9.  UA negative for UTI. INR 4.2 (aPTT 45.8). Na 128- given 1L NS IVF.  CO2 18, renal fxn baseline. CXR shows CM + pulmonary edema as well as "Suspected small right and trace left pleural effusions with right basilar subsegmental atelectasis/infiltrate."  CT A/P with PO/IV contrast shows no acute intraabdominal concerns.  Imaging does show "Trace dependent right pleural fluid with adjacent atelectatic change.  Consolidation in the medial segment right middle lobe.  Bandlike opacities are seen at the right lung base, likely atelectasis or scarring."    Patient was admitted and found to have Strep Pneumoniae bacteremia and RLL PNA.  He has been on Vanc and ceftriaxone.  Repeat BCxs are NGTD.  Constitutional symptoms have resolved and his pleuritic pain is dramatically better.  Influenza testing is negative and WBC is normalized.  No new subjective & objective note has been filed under this hospital service since the last note was generated.    "

## 2019-02-19 VITALS
SYSTOLIC BLOOD PRESSURE: 119 MMHG | DIASTOLIC BLOOD PRESSURE: 87 MMHG | BODY MASS INDEX: 28.02 KG/M2 | OXYGEN SATURATION: 96 % | TEMPERATURE: 98 F | HEIGHT: 71 IN | HEART RATE: 63 BPM | WEIGHT: 200.19 LBS | RESPIRATION RATE: 20 BRPM

## 2019-02-19 LAB
ANION GAP SERPL CALC-SCNC: 5 MMOL/L
BASOPHILS # BLD AUTO: 0.03 K/UL
BASOPHILS NFR BLD: 0.3 %
BUN SERPL-MCNC: 10 MG/DL
CALCIUM SERPL-MCNC: 9.9 MG/DL
CHLORIDE SERPL-SCNC: 99 MMOL/L
CO2 SERPL-SCNC: 28 MMOL/L
CREAT SERPL-MCNC: 0.7 MG/DL
DIFFERENTIAL METHOD: ABNORMAL
EOSINOPHIL # BLD AUTO: 0.2 K/UL
EOSINOPHIL NFR BLD: 1.7 %
ERYTHROCYTE [DISTWIDTH] IN BLOOD BY AUTOMATED COUNT: 12.2 %
EST. GFR  (AFRICAN AMERICAN): >60 ML/MIN/1.73 M^2
EST. GFR  (NON AFRICAN AMERICAN): >60 ML/MIN/1.73 M^2
GLUCOSE SERPL-MCNC: 101 MG/DL
HCT VFR BLD AUTO: 33.7 %
HGB BLD-MCNC: 11 G/DL
IMM GRANULOCYTES # BLD AUTO: 0.22 K/UL
IMM GRANULOCYTES NFR BLD AUTO: 2.2 %
INR PPP: 1.5
LYMPHOCYTES # BLD AUTO: 1.5 K/UL
LYMPHOCYTES NFR BLD: 14.8 %
MCH RBC QN AUTO: 34.9 PG
MCHC RBC AUTO-ENTMCNC: 32.6 G/DL
MCV RBC AUTO: 107 FL
MONOCYTES # BLD AUTO: 1.4 K/UL
MONOCYTES NFR BLD: 13.2 %
NEUTROPHILS # BLD AUTO: 6.9 K/UL
NEUTROPHILS NFR BLD: 67.8 %
NRBC BLD-RTO: 0 /100 WBC
PLATELET # BLD AUTO: 324 K/UL
PMV BLD AUTO: 8.8 FL
POTASSIUM SERPL-SCNC: 4.4 MMOL/L
PROTHROMBIN TIME: 15 SEC
RBC # BLD AUTO: 3.15 M/UL
SODIUM SERPL-SCNC: 132 MMOL/L
WBC # BLD AUTO: 10.21 K/UL

## 2019-02-19 PROCEDURE — 25000003 PHARM REV CODE 250: Performed by: PHYSICIAN ASSISTANT

## 2019-02-19 PROCEDURE — 80048 BASIC METABOLIC PNL TOTAL CA: CPT

## 2019-02-19 PROCEDURE — 99231 PR SUBSEQUENT HOSPITAL CARE,LEVL I: ICD-10-PCS | Mod: ,,, | Performed by: HOSPITALIST

## 2019-02-19 PROCEDURE — 99231 SBSQ HOSP IP/OBS SF/LOW 25: CPT | Mod: ,,, | Performed by: HOSPITALIST

## 2019-02-19 PROCEDURE — 85610 PROTHROMBIN TIME: CPT

## 2019-02-19 PROCEDURE — 90686 IIV4 VACC NO PRSV 0.5 ML IM: CPT | Performed by: HOSPITALIST

## 2019-02-19 PROCEDURE — 85025 COMPLETE CBC W/AUTO DIFF WBC: CPT

## 2019-02-19 PROCEDURE — 90670 PCV13 VACCINE IM: CPT | Performed by: HOSPITALIST

## 2019-02-19 PROCEDURE — 36415 COLL VENOUS BLD VENIPUNCTURE: CPT

## 2019-02-19 PROCEDURE — 63600175 PHARM REV CODE 636 W HCPCS: Performed by: HOSPITALIST

## 2019-02-19 PROCEDURE — 90471 IMMUNIZATION ADMIN: CPT | Performed by: HOSPITALIST

## 2019-02-19 PROCEDURE — 25000003 PHARM REV CODE 250: Performed by: EMERGENCY MEDICINE

## 2019-02-19 PROCEDURE — 90472 IMMUNIZATION ADMIN EACH ADD: CPT | Performed by: HOSPITALIST

## 2019-02-19 PROCEDURE — 25000003 PHARM REV CODE 250: Performed by: HOSPITALIST

## 2019-02-19 RX ORDER — OXYCODONE HYDROCHLORIDE 10 MG/1
10 TABLET ORAL EVERY 4 HOURS PRN
Qty: 25 TABLET | Refills: 0 | Status: SHIPPED | OUTPATIENT
Start: 2019-02-19

## 2019-02-19 RX ORDER — LEVOFLOXACIN 750 MG/1
750 TABLET ORAL DAILY
Qty: 11 TABLET | Refills: 0 | Status: SHIPPED | OUTPATIENT
Start: 2019-02-19 | End: 2019-03-02

## 2019-02-19 RX ADMIN — OXYCODONE HYDROCHLORIDE 10 MG: 10 TABLET ORAL at 12:02

## 2019-02-19 RX ADMIN — FUROSEMIDE 20 MG: 20 TABLET ORAL at 10:02

## 2019-02-19 RX ADMIN — OXYCODONE HYDROCHLORIDE 10 MG: 10 TABLET ORAL at 06:02

## 2019-02-19 RX ADMIN — GABAPENTIN 300 MG: 300 CAPSULE ORAL at 10:02

## 2019-02-19 RX ADMIN — OXYCODONE HYDROCHLORIDE 10 MG: 10 TABLET ORAL at 02:02

## 2019-02-19 RX ADMIN — LEVOFLOXACIN 750 MG: 750 TABLET, FILM COATED ORAL at 10:02

## 2019-02-19 RX ADMIN — WARFARIN SODIUM 5 MG: 5 TABLET ORAL at 05:02

## 2019-02-19 RX ADMIN — PNEUMOCOCCAL 13-VALENT CONJUGATE VACCINE 0.5 ML: 2.2; 2.2; 2.2; 2.2; 2.2; 4.4; 2.2; 2.2; 2.2; 2.2; 2.2; 2.2; 2.2 INJECTION, SUSPENSION INTRAMUSCULAR at 05:02

## 2019-02-19 RX ADMIN — INFLUENZA A VIRUS A/MICHIGAN/45/2015 X-275 (H1N1) ANTIGEN (FORMALDEHYDE INACTIVATED), INFLUENZA A VIRUS A/SINGAPORE/INFIMH-16-0019/2016 IVR-186 (H3N2) ANTIGEN (FORMALDEHYDE INACTIVATED), INFLUENZA B VIRUS B/PHUKET/3073/2013 ANTIGEN (FORMALDEHYDE INACTIVATED), AND INFLUENZA B VIRUS B/MARYLAND/15/2016 BX-69A ANTIGEN (FORMALDEHYDE INACTIVATED) 0.5 ML: 15; 15; 15; 15 INJECTION, SUSPENSION INTRAMUSCULAR at 05:02

## 2019-02-19 RX ADMIN — OXYCODONE HYDROCHLORIDE 10 MG: 10 TABLET ORAL at 10:02

## 2019-02-19 NOTE — DISCHARGE SUMMARY
Discharge Summary  Hospital Medicine    Patient Name: Ed Bunch  MRN:  [id number]  Hospital Medicine Team: [facility] HOSP MED S Martin Singh MD  Date of Admission:  2/15/2019     Date of Discharge:  02/19/2019  Length of Stay:  LOS: 4 days   Principal Problem:  Sepsis due to pneumonia     Active Hospital Problems    Diagnosis  POA    *Sepsis due to pneumonia [J18.9, A41.9]  Yes    Streptococcal bacteremia [R78.81, B95.5]  Yes    Pneumonia of right middle lobe due to infectious organism [J18.1]  Yes    Acute on chronic heart failure [I50.9]  Unknown    Atrial fibrillation with RVR [I48.91]  Yes    HTN (hypertension) [I10]  Yes    Current use of long term anticoagulation [Z79.01]  Not Applicable    Diarrhea [R19.7]  Yes    Hyponatremia [E87.1]  Yes      Resolved Hospital Problems   No resolved problems to display.       History of Present Illness:      Ed Bunch is a 63 y.o. year old male with co-morbidities including chronic afib (on coumadin), CHF, and HTN who presents to the ED complaining of  intermittent,worsening, non-radiating right flank pain this afternoon. Pain is worse with palpation, heavy breathing and described as sharp. Pt reports prior similar episode 2 days ago, which resolved after approximately 4 hours. He states that symptoms were still present at this time, but relatively more mild. Wife at bedside states he was dx with the flu 1 week ago. Pt reports f/c/night sweats, OTERO, orthopnea, productive yellow cough x7 days, and diarrhea for 5 days.  Patient reports daughter recently flu positive and found flu positive yesterday at [facility].  Pt denies any dysuria or radiating grown pain but does report hematuria yesterday (resolved).  No hx of kidney stones. Pt states he has attempted to manage pain with tylenol with no relief. Pt reports weight loss and reduced PO intake over the last week.  No recent abx use or hospitalization.     In ED, tachypneic to 20s, 91-99% on RA. CBC shows WBC 20.94k, LA 0.9.  " UA negative for UTI. INR 4.2 (aPTT 45.8). Na 128- given 1L NS IVF.  CO2 18, renal fxn baseline. CXR shows CM + pulmonary edema as well as "Suspected small right and trace left pleural effusions with right basilar subsegmental atelectasis/infiltrate."  CT A/P with PO/IV contrast shows no acute intraabdominal concerns.  Imaging does show "Trace dependent right pleural fluid with adjacent atelectatic change.  Consolidation in the medial segment right middle lobe.  Bandlike opacities are seen at the right lung base, likely atelectasis or scarring."        Hospital Course of Principle Problem Addressed:       Sepsis due to Pneumonia of right middle lobe due to infectious organism  Streptococcal Pneumonia bacteremia      - elevated WBC and HR  - growing 2/2 bottles positive for strep bacteremia  - on Rocephin 2g IV daily; repeat blood cultures from 2/16 TD  - ID consulted, appreciate recs;   - empirically treating with tamiflu with recent flu exposure    - likely be able to go home on levaquin for a total of 14 days therapy  Stop date: 3/2/19    chronic diastolic heart failure     - No evidence of hypervolemia on exam, but pulmonary edema on CXR +orthopnea  - compensated but would benefit from diuretic. Will hold off on treatment as patient on RA, hyponatremic, and currently receiving tx for CAP  - echo reviewed   - continue dilt; hold ACEI, diuretic (lasix PRN) to prevent hypovolemia  - monitor response with daily weights, strict I/Os, oxygen requirements  - Na restricted/fluid restricted diet 1500 ml      A-fib with RVR     Current use of long term anticoagulation  Rate controlled with diltiazem 240 mg PO nightly; AC with warfarin  - supratherapeutic INR of 4.2; will hold warfarin and check INR daily (INR 5 at last visit)  - f/w cardiology through [facility]  - [facility] 2  - monitor on tele; check lytes      Diarrhea     Unclear etiology but likely AGE  - low suspicion for C. Diff without recent abx or hospitalization  - " check stool studies, C. diff  - c/w abx for CAP      Hyponatremia     - likely 2/2 poor PO intake and GI losses  - Given 1L NS in ED.  C/w mIVF hydration- recheck with AM labs but will give slowly with heart failure      HTN (hypertension)     BP controlled  - hold lisinopril to prevent hypotension  - reduced dilt dose from 240 to 120 on admission, but will continue 240 mg nightly thereafter                      Diet:  caridac  GI PPx:    DVT PPx:    Goals of Care:  full     High Risk Conditions:  Sepsis      Disposition:  Today           Other Medical Problems Addressed in the Hospital:         Significant Diagnostic Tests/Imaging:     Recent Labs   Lab 02/15/19  1806 02/16/19 0445 02/17/19 0319 02/18/19  0341 02/19/19  0320   WBC 20.94* 15.93* 10.82 11.58 10.21   HGB 12.2* 10.9* 11.2* 11.0* 11.0*   HCT 35.3* 33.3* 33.1* 33.6* 33.7*    256 275 313 324     Recent Labs   Lab 02/15/19  1806 02/16/19  0445 02/17/19  0318 02/18/19  0341 02/19/19  0320   * 130* 134* 134* 132*   K 4.3 4.3 4.5 4.5 4.4   CL 99 102 103 102 99   CO2 18* 21* 24 25 28   BUN 23 18 13 13 10   CREATININE 1.0 0.7 0.7 0.7 0.7   CALCIUM 10.5 9.8 10.1 10.0 9.9   MG  --  2.0  --   --   --    PHOS  --  2.7  --   --   --    PROT 8.2  --   --   --   --    BILITOT 0.7  --   --   --   --    ALKPHOS 132  --   --   --   --    ALT 29  --   --   --   --    AST 25  --   --   --   --          Special Treatments/Procedures:         Discharge Medications:      Current Discharge Medication List      START taking these medications    Details   levoFLOXacin (LEVAQUIN) 750 MG tablet Take 1 tablet (750 mg total) by mouth once daily. for 11 days  Qty: 11 tablet, Refills: 0      oxyCODONE (ROXICODONE) 10 mg Tab immediate release tablet Take 1 tablet (10 mg total) by mouth every 4 (four) hours as needed.  Qty: 25 tablet, Refills: 0         CONTINUE these medications which have NOT CHANGED    Details   acetaminophen (TYLENOL) 500 MG tablet Take 500 mg by  mouth.      diltiaZEM (TIAZAC) 240 MG Cs24 Refills: 11      gabapentin (NEURONTIN) 300 MG capsule TK ONE C PO  D  Refills: 8      lisinopril 10 MG tablet TK 1 T PO  QHS  Refills: 10      multivitamin capsule Take 1 capsule by mouth.      furosemide (LASIX) 20 MG tablet TK 1 T PO  D  Refills: 2      mupirocin (BACTROBAN) 2 % ointment Apply topically 2 (two) times daily. AAA  Qty: 22 g, Refills: 0    Associated Diagnoses: Abrasion of right elbow, initial encounter      warfarin (COUMADIN) 7.5 MG tablet Refills: 1             Discharge Diet:cardiac diet    Activity: activity as tolerated    Discharge Condition: Good    Disposition: Home or Self Care    Time spent on the discharge of the patient including review of hospital course with the patient. reviewing discharge medications and arranging follow-up care 35 minutes.  Patient was seen and examined on the date of discharge and determined to be suitable for discharge.    No future appointments.    Martin Singh MD  Medical Director [facility] Medicine  Spectra:  [phone]  Pager: [phone]

## 2019-02-19 NOTE — PHYSICIAN QUERY
"PT Name: Ed Bunch  MR #: [id number]    Physician Query Form - Heart  Condition Clarification     CDS: Zahida Martinez RN, CCDS         Contact information :ext [phone]  [email or web address]     This form is a permanent document in the medical record.     Query Date: February 19, 2019    By submitting this query, we are merely seeking further clarification of documentation. Please utilize your independent clinical judgment when addressing the question(s) below.    The medical record contains the following   Indicators     Supporting Clinical Findings Location in Medical Record   x BNP BNP 84 Lab 2/15/19   x EF "Low normal left ventricular systolic function. The estimated ejection fraction is 50%" TTE Echo report  2/16/19   x Radiology findings "Findings suggesting pulmonary edema/CHF pattern with right greater than left small pleural effusions." CXR 2/15/19   x Echo Results Mildly reduced right ventricular systolic function     "Ascites" documented     x "SOB" or "OTERO" documented Positive for cough (productive yellow) and shortness of breath"   H&P 2/15/19    "Hypoxia" documented     x Heart Failure documented "Acute on chronic heart failure" H&P 2/15/19    "Edema" documented     x Diuretics/Meds  lisinopril 10 MG tablet   furosemide (LASIX) 20 MG tablet     Home meds per H&P 2/15/19   x Treatment: Furosemide 20 mg po MAR 2/16/19   x Other:  " No evidence of hypervolemia on exam, but pulmonary edema on CXR +orthopnea  - compensated but would benefit from diuretic. Will hold off on treatment as patient on RA, hyponatremic, and currently receiving tx for CAP" H&P 2/15/19   Heart failure (HF) can be acute, chronic or both. It is generally further specificed as systolic, diastolic, or combined. Lastly, it is important to identify an underlying etiology if known or suspected.   Common clues to acute exacerbation:  Rapidly progressive symptoms (w/in 2 weeks of presentation), using IV diuretics to treat, using " supplemental O2, pulmonary edema on Xray, MI w/in 4 weeks, and/or BNP >500  Systolic Heart Failure: is defined as chart documentation of a left ventricular ejection fraction (LVEF) less than 40%   Diastolic Heart Failure: is defined as a left ventricular ejection fraction (LVEF) greater than 40%   +      Evidence of diastolic dysfunction on echocardiography OR    Right heart catheterization wedge pressure above 12 mm Hg OR    Left heart catheterization left ventricular end diastolic pressure 18 mm Hg or above.  References: *American Heart Association    The clinical guidelines noted below are only system guidelines, and do not replace the providers clinical judgment.         Doctor, please specify the diagnosis associated with above clinical findings  Please clarify CHF type and acuity/chronicity.    [ x  ] Chronic Diastolic Heart Failure - Pre-existing diastolic HF diagnosis.  EF > 40%  without  acute HF symptoms documented  [   ] Acute on Chronic Diastolic Heart Failure -    Pre-existing diastoic HF diagnosis.  EF > 40%  and acute HF symptoms documented                                   [   ] Other Type of Heart Failure (please specify type): _________________________     [   ] Other (please specify): ___________________________________    [   ] Clinically Undetermined                          Please document in your progress notes daily for the duration of treatment until resolved and include in your discharge summary.

## 2019-02-19 NOTE — PROGRESS NOTES
Progress Note   Hospital Medicine         Patient Name: Ed Bunch  MRN:  [id number]  [facility] Medicine Team: [facility] HOSP MED S Martin Singh MD  Date of Admission:  2/15/2019     Length of Stay:  LOS: 4 days   Expected Discharge Date: 2/19/2019  Principal Problem:  Sepsis due to pneumonia       Subjective:     Interval History/Overnight Events:  Patient doing well today; plan to d/c home on levaquin    Review of Systems   Constitutional: Negative for chills, fatigue, fever.   HENT: Negative for sore throat, trouble swallowing.    Eyes: Negative for photophobia, visual disturbance.   Respiratory: Negative for cough, shortness of breath.    Cardiovascular: Negative for chest pain, palpitations, leg swelling.   Gastrointestinal: Negative for abdominal pain, constipation, diarrhea, nausea, vomiting.   Endocrine: Negative for cold intolerance, heat intolerance.   Genitourinary: Negative for dysuria, frequency.   Musculoskeletal: Negative for arthralgias, myalgias.   Skin: Negative for rash, wound, erythema   Neurological: Negative for dizziness, syncope, weakness, light-headedness.   Psychiatric/Behavioral: Negative for confusion, hallucinations, anxiety  All other systems reviewed and are negative.    Objective:     Temp:  [96.9 °F (36.1 °C)-98.9 °F (37.2 °C)]   Pulse:  [57-87]   Resp:  [18-20]   BP: (117-124)/(68-87)   SpO2:  [92 %-96 %]       Physical Exam:  Constitutional: appears weak and ill  Head: Normocephalic and atraumatic.   Mouth/Throat: Oropharynx is clear and moist.   Eyes: EOM are normal. Pupils are equal, round, and reactive to light. No scleral icterus.   Neck: Normal range of motion. Neck supple.   Cardiovascular: Normal rate and regular rhythm.  No murmur heard.  Pulmonary/Chest: Effort normal and breath sounds normal. No respiratory distress. No wheezes, rales, or rhonchi  Abdominal: Soft. Bowel sounds are normal.  No distension or tenderness  Musculoskeletal: Normal range of motion. No edema.    Neurological: Alert and oriented to person, place, and time.   Skin: Skin is warm and dry.   Psychiatric: Normal mood and affect. Behavior is normal.     Recent Labs   Lab 02/15/19  1806 02/16/19  0445 02/17/19  0319 02/18/19  0341 02/19/19  0320   WBC 20.94* 15.93* 10.82 11.58 10.21   HGB 12.2* 10.9* 11.2* 11.0* 11.0*   HCT 35.3* 33.3* 33.1* 33.6* 33.7*    256 275 313 324     Recent Labs   Lab 02/16/19 0445 02/17/19 0318 02/18/19 0341 02/19/19  0320   * 134* 134* 132*   K 4.3 4.5 4.5 4.4    103 102 99   CO2 21* 24 25 28   BUN 18 13 13 10   CREATININE 0.7 0.7 0.7 0.7   GLU 89 94 89 101   CALCIUM 9.8 10.1 10.0 9.9   MG 2.0  --   --   --    PHOS 2.7  --   --   --      Recent Labs   Lab 02/15/19  1806  02/17/19  0318 02/18/19  0341 02/19/19  0320   ALKPHOS 132  --   --   --   --    ALT 29  --   --   --   --    AST 25  --   --   --   --    ALBUMIN 3.3*  --   --   --   --    PROT 8.2  --   --   --   --    BILITOT 0.7  --   --   --   --    INR  --    < > 2.6* 1.6* 1.5*    < > = values in this interval not displayed.     No results for input(s): POCTGLUCOSE in the last 168 hours.     diltiaZEM  300 mg Oral QHS    furosemide  20 mg Oral Daily    gabapentin  300 mg Oral Daily    levoFLOXacin  750 mg Oral Daily    lidocaine  1 patch Transdermal Q24H    multivitamin  1 tablet Oral Daily    warfarin  5 mg Oral Daily       Assessment and Plan     Mr. Ed Bunch is a 63 y.o. male who presented to [facility] on 2/15/2019 with     Hospital Course:    Mr. Ed Bunch was admitted to Hospital Medicine for management of     Active Hospital Problems    Diagnosis  POA    *Sepsis due to pneumonia [J18.9, A41.9]  Yes    Streptococcal bacteremia [R78.81, B95.5]  Yes    Pneumonia of right middle lobe due to infectious organism [J18.1]  Yes    Acute on chronic heart failure [I50.9]  Unknown    Atrial fibrillation with RVR [I48.91]  Yes    HTN (hypertension) [I10]  Yes    Current use of long term  anticoagulation [Z79.01]  Not Applicable    Diarrhea [R19.7]  Yes    Hyponatremia [E87.1]  Yes      Resolved Hospital Problems   No resolved problems to display.     Sepsis due to Pneumonia of right middle lobe due to infectious organism  Streptococcal Pneumonia bacteremia      - elevated WBC and HR  - growing 2/2 bottles positive for strep bacteremia  - on Rocephin 2g IV daily; repeat blood cultures from 2/16 NGTD  - ID consulted, appreciate recs;   - empirically treating with tamiflu with recent flu exposure    - likely be able to go home on levaquin for a total of 14 days therapy  Stop date: 3/2/19    chronic diastolic heart failure     - No evidence of hypervolemia on exam, but pulmonary edema on CXR +orthopnea  - compensated but would benefit from diuretic. Will hold off on treatment as patient on RA, hyponatremic, and currently receiving tx for CAP  - echo reviewed   - continue dilt; hold ACEI, diuretic (lasix PRN) to prevent hypovolemia  - monitor response with daily weights, strict I/Os, oxygen requirements  - Na restricted/fluid restricted diet 1500 ml      A-fib with RVR     Current use of long term anticoagulation  Rate controlled with diltiazem 240 mg PO nightly; AC with warfarin  - supratherapeutic INR of 4.2; will hold warfarin and check INR daily (INR 5 at last visit)  - f/w cardiology through [facility]  - [facility] 2  - monitor on tele; check lytes      Diarrhea     Unclear etiology but likely AGE  - low suspicion for C. Diff without recent abx or hospitalization  - check stool studies, C. diff  - c/w abx for CAP      Hyponatremia     - likely 2/2 poor PO intake and GI losses  - Given 1L NS in ED.  C/w mIVF hydration- recheck with AM labs but will give slowly with heart failure      HTN (hypertension)     BP controlled  - hold lisinopril to prevent hypotension  - reduced dilt dose from 240 to 120 on admission, but will continue 240 mg nightly thereafter                 Diet:  caridac  GI PPx:    DVT PPx:     Goals of Care:  full    High Risk Conditions:  Sepsis     Disposition:  Today     Martin Singh MD  Medical Director [facility] Medicine  Spectra:  [phone]  Pager: [phone]

## 2019-02-19 NOTE — PROGRESS NOTES
PHARMACY CONSULT NOTE: FARZANEH Bunch is a 63 y.o. male on warfarin therapy for Atrial Fibrillation. PharmD has been consulted for warfarin dosing.     Current order: 5 mg PO daily  Home dose: 11.25 on MWF and 7.5 mg PO all other days   Coumadin clinic enrollment: Patient follows with cardiology at [facility]  INR goal: 2-3           Lab Results   Component Value Date    INR 1.5 (H) 02/19/2019     INR 1.6 (H) 02/18/2019     INR 2.6 (H) 02/17/2019     INR 4.2 (H) 02/16/2019      Significant drug interactions: levofloxacin  Diet: Adult Cardiac      Recommendation(s):   · INR today 1.5 after 2 doses of 5 mg, INR above supratherapeutic on admission  · Hemoglobin stable, would increase to 7.5 mg PO daily tonight, recommend INR check Friday  · Pharmacy will continue to follow and monitor warfarin     Thank you for the consult,  Slava BurgessD  PGY-2 Internal Medicine Pharmacy Resident  EXT [phone]     **Note: Consults are reviewed Monday-Friday 7:00am-3:30pm. The above recommendations are only suggested. The recommendations should be considered in conjunction with all patient factors.**

## 2019-02-21 LAB
BACTERIA BLD CULT: NORMAL
BACTERIA BLD CULT: NORMAL